# Patient Record
Sex: MALE | Race: AMERICAN INDIAN OR ALASKA NATIVE
[De-identification: names, ages, dates, MRNs, and addresses within clinical notes are randomized per-mention and may not be internally consistent; named-entity substitution may affect disease eponyms.]

---

## 2018-01-25 ENCOUNTER — HOSPITAL ENCOUNTER (EMERGENCY)
Dept: HOSPITAL 31 - C.ER | Age: 38
Discharge: HOME | End: 2018-01-25
Payer: COMMERCIAL

## 2018-01-25 VITALS
RESPIRATION RATE: 20 BRPM | DIASTOLIC BLOOD PRESSURE: 76 MMHG | HEART RATE: 98 BPM | SYSTOLIC BLOOD PRESSURE: 128 MMHG | TEMPERATURE: 101 F

## 2018-01-25 VITALS — OXYGEN SATURATION: 99 %

## 2018-01-25 DIAGNOSIS — B34.9: ICD-10-CM
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
## 2018-01-25 DIAGNOSIS — I10: ICD-10-CM

## 2018-01-25 DIAGNOSIS — S39.011A: Primary | ICD-10-CM

## 2018-01-25 PROCEDURE — 99283 EMERGENCY DEPT VISIT LOW MDM: CPT

## 2018-01-25 PROCEDURE — 96374 THER/PROPH/DIAG INJ IV PUSH: CPT

## 2018-01-25 NOTE — C.PDOC
History Of Present Illness





37 year old male with previous medical history of hypertension, who presents to 

the emergency department with a complaint of left groin pain associated with 

general weakness, malaise, subjective fever and chills ongoing today at work. 

Patient reported he has been working 3 months straight and feels "run down".





PMD: none provided


Time Seen by Provider: 01/25/18 01:29


Chief Complaint (Nursing): Groin Pain


History Per: Patient


History/Exam Limitations: no limitations


Onset/Duration Of Symptoms: Days (x1)


Current Symptoms Are (Timing): Still Present


Severity: Mild


Pain Scale Rating Of: 2


Recent travel outside of the [place]: No


Additional History Per: Patient





Past Medical History


Reviewed: Historical Data, Nursing Documentation, Vital Signs


Vital Signs: 


 Last Vital Signs











Temp  102.3 F H  01/25/18 03:53


 


Pulse  115 H  01/25/18 03:53


 


Resp  22   01/25/18 03:53


 


BP  123/65   01/25/18 03:53


 


Pulse Ox  98   01/25/18 03:53














- Medical History


PMH: HTN


   Denies: Chronic Kidney Disease


Surgical History: Appendectomy





- [facility] Procedures








INSERTION OF INFUSION DEV INTO SUP VENA CAVA, PERC APPROACH (12/17/15)


INSERTION OF INFUSION DEVICE INTO R ATRIUM, PERC APPROACH (12/17/15)








Family History: States: Hypertension





- Social History


Hx Tobacco Use: No


Hx Alcohol Use: No


Hx Substance Use: No





- Immunization History


Hx Tetanus Toxoid Vaccination: No


Hx Influenza Vaccination: No


Hx Pneumococcal Vaccination: No





Review Of Systems


Constitutional: Positive for: Fever, Chills, Weakness, Malaise


ENT: Negative for: Nose Congestion


Cardiovascular: Negative for: Chest Pain


Respiratory: Negative for: Shortness of Breath


Gastrointestinal: Negative for: Nausea, Vomiting, Abdominal Pain


Genitourinary: Positive for: Other (left-sided groin pain)


Musculoskeletal: Negative for: Back Pain


Skin: Negative for: Rash


Neurological: Negative for: Weakness


Psych: Negative for: Anxiety





Physical Exam





- Physical Exam


Appears: Well, No Acute Distress, Other (morbidly obese)


Skin: Warm, Dry


Head: Normacephalic


Eye(s): bilateral: Normal Inspection


Oral Mucosa: Moist


Tongue: Normal Appearing


Throat: Normal


Neck: Supple


Chest: Symmetrical


Cardiovascular: Rhythm Regular


Respiratory: No Decreased Breath Sounds, No Rales, No Rhonchi, No Wheezing


Gastrointestinal/Abdominal: Normal Exam, Soft, No Tenderness


Back: Normal Inspection, No CVA Tenderness, No Vertebral Tenderness


Male Genital: Inguinal Tenderness (left-sided on deep palpation), No Other (

hernia noted)


Extremity: No Tenderness


Extremity: Bilateral: Atraumatic


Neurological/Psych: Oriented x3, Normal Speech, Normal Cognition, Other (

speaking full sentences)


Gait: Steady





ED Course And Treatment


O2 Sat by Pulse Oximetry: 99 (RA)


Pulse Ox Interpretation: Normal


Progress Note: 5:20 feels fine. no complaints


Reevaluation Time: 05:34


Reassessment Condition: Improved





Medical Decision Making


Medical Decision Making: 





Initial Impression: Groin pain





Initial Plan:


* BMP


* CBC


* Lactated Ringers 1,000ml IV per 400mls/hr


* Toradol 30mg IVP





--------------------------------------------------------------------------------

-----------------


Scribe Attestation:


Documented by Yvonne Caballero, acting as a scribe for Michelle Montenegro MD.





Provider Scribe Attestation:


All medical record entries made by the Scribe were at my direction and 

personally dictated by me. I have reviewed the chart and agree that the record 

accurately reflects my personal performance of the history, physical exam, 

medical decision making, and the department course for this patient. I have 

also personally directed, reviewed, and agree with the discharge instructions 

and disposition.





Disposition


Counseled Patient/Family Regarding: Studies Performed, Diagnosis, Need For 

Followup, Rx Given





- Disposition


Referrals: 


[facility] [Outside]


[facility] [Outside]


Disposition: HOME/ ROUTINE


Disposition Time: 01:29


Condition: FAIR


Additional Instructions: 


please return if symptoms recur


Prescriptions: 


Oseltamivir Phosphate [Tamiflu] 75 mg PO BID #10 capsule


Instructions:  Viral Syndrome (ED), Groin Pain (ED)


Forms:  [facility] (English), Work Excuse





- Clinical Impression


Clinical Impression: 


 Viral syndrome, Strain of left inguinal muscle

## 2018-01-29 ENCOUNTER — HOSPITAL ENCOUNTER (INPATIENT)
Dept: HOSPITAL 31 - C.ER | Age: 38
LOS: 8 days | Discharge: HOME | DRG: 603 | End: 2018-02-06
Attending: INTERNAL MEDICINE | Admitting: INTERNAL MEDICINE
Payer: COMMERCIAL

## 2018-01-29 DIAGNOSIS — Z90.49: ICD-10-CM

## 2018-01-29 DIAGNOSIS — I89.0: ICD-10-CM

## 2018-01-29 DIAGNOSIS — F17.210: ICD-10-CM

## 2018-01-29 DIAGNOSIS — G47.33: ICD-10-CM

## 2018-01-29 DIAGNOSIS — L29.9: ICD-10-CM

## 2018-01-29 DIAGNOSIS — I10: ICD-10-CM

## 2018-01-29 DIAGNOSIS — E66.01: ICD-10-CM

## 2018-01-29 DIAGNOSIS — K21.9: ICD-10-CM

## 2018-01-29 DIAGNOSIS — E11.65: ICD-10-CM

## 2018-01-29 DIAGNOSIS — L03.116: Primary | ICD-10-CM

## 2018-01-29 DIAGNOSIS — Z88.0: ICD-10-CM

## 2018-01-29 LAB
ALBUMIN SERPL-MCNC: 3.9 G/DL (ref 3.5–5)
ALBUMIN/GLOB SERPL: 0.9 {RATIO} (ref 1–2.1)
ALT SERPL-CCNC: 31 U/L (ref 21–72)
APTT BLD: 22 SECONDS (ref 21–34)
AST SERPL-CCNC: 32 U/L (ref 17–59)
BASOPHILS # BLD AUTO: 0.2 K/UL (ref 0–0.2)
BASOPHILS NFR BLD: 1.8 % (ref 0–2)
BILIRUB UR-MCNC: NEGATIVE MG/DL
BUN SERPL-MCNC: 15 MG/DL (ref 9–20)
CALCIUM SERPL-MCNC: 9.5 MG/DL (ref 8.6–10.4)
D DIMER: 552 NG/MLDDU (ref 0–243)
EOSINOPHIL # BLD AUTO: 0.2 K/UL (ref 0–0.7)
EOSINOPHIL NFR BLD: 1 % (ref 0–4)
EOSINOPHIL NFR BLD: 1.5 % (ref 0–4)
ERYTHROCYTE [DISTWIDTH] IN BLOOD BY AUTOMATED COUNT: 13.9 % (ref 11.5–14.5)
GFR NON-AFRICAN AMERICAN: > 60
GLUCOSE UR STRIP-MCNC: (no result) MG/DL
HGB BLD-MCNC: 10.6 G/DL (ref 12–18)
HYPOCHROMIC: SLIGHT
INR PPP: 1.2
LEUKOCYTE ESTERASE UR-ACNC: (no result) LEU/UL
LYMPHOCYTE: 10 % (ref 20–40)
LYMPHOCYTES # BLD AUTO: 0.9 K/UL (ref 1–4.3)
LYMPHOCYTES NFR BLD AUTO: 7.8 % (ref 20–40)
MCH RBC QN AUTO: 28.7 PG (ref 27–31)
MCHC RBC AUTO-ENTMCNC: 33.1 G/DL (ref 33–37)
MCV RBC AUTO: 86.6 FL (ref 80–94)
MONOCYTE: 17 % (ref 0–10)
MONOCYTES # BLD: 1.2 K/UL (ref 0–0.8)
MONOCYTES NFR BLD: 9.8 % (ref 0–10)
NEUTROPHILS # BLD: 9.7 K/UL (ref 1.8–7)
NEUTROPHILS NFR BLD AUTO: 72 % (ref 50–75)
NEUTROPHILS NFR BLD AUTO: 79.1 % (ref 50–75)
NRBC BLD AUTO-RTO: 0 % (ref 0–2)
PH UR STRIP: 6 [PH] (ref 5–8)
PLATELET # BLD EST: NORMAL 10*3/UL
PLATELET # BLD: 228 K/UL (ref 130–400)
PMV BLD AUTO: 10.7 FL (ref 7.2–11.7)
PROT UR STRIP-MCNC: (no result) MG/DL
PROTHROMBIN TIME: 13.4 SECONDS (ref 9.7–12.2)
RBC # BLD AUTO: 3.69 MIL/UL (ref 4.4–5.9)
RBC # UR STRIP: (no result) /UL
SP GR UR STRIP: 1.02 (ref 1–1.03)
SQUAMOUS EPITHIAL: < 1 /HPF (ref 0–5)
TOTAL CELLS COUNTED BLD: 100
URINE NITRATE: NEGATIVE
UROBILINOGEN UR-MCNC: NORMAL MG/DL (ref 0.2–1)
WBC # BLD AUTO: 12.3 K/UL (ref 4.8–10.8)

## 2018-01-29 NOTE — C.PDOC
History Of Present Illness


37 year old male sent to ER by Dr. Walker for swelling and pain to the left 

lower extremity for the past week. Denies fever or chills.


Time Seen by Provider: 01/29/18 21:02


Chief Complaint (Nursing): Lower Extremity Problem/Injury


History Per: Patient


History/Exam Limitations: no limitations


Onset/Duration Of Symptoms: Days


Current Symptoms Are (Timing): Still Present


Recent travel outside of the [place]: No





Past Medical History


Reviewed: Historical Data, Nursing Documentation, Vital Signs


Vital Signs: 


 Last Vital Signs











Temp  99.9 F H  01/29/18 20:47


 


Pulse  101 H  01/29/18 20:47


 


Resp  18   01/29/18 20:47


 


BP  132/78   01/29/18 20:47


 


Pulse Ox  95   01/29/18 22:37














- Medical History


PMH: HTN


Surgical History: Appendectomy





- [facility] Procedures








INSERTION OF INFUSION DEV INTO SUP VENA CAVA, PERC APPROACH (12/17/15)


INSERTION OF INFUSION DEVICE INTO R ATRIUM, PERC APPROACH (12/17/15)








Family History: States: Hypertension





- Social History


Hx Tobacco Use: No


Hx Alcohol Use: No


Hx Substance Use: No





- Immunization History


Hx Tetanus Toxoid Vaccination: No


Hx Influenza Vaccination: No


Hx Pneumococcal Vaccination: No





Review Of Systems


Constitutional: Negative for: Fever, Chills


Cardiovascular: Negative for: Chest Pain, Palpitations


Gastrointestinal: Positive for: Abdominal Pain.  Negative for: Nausea, Vomiting


Genitourinary: Negative for: Dysuria, Hematuria


Musculoskeletal: Positive for: Leg Pain (w/ swelling)





Physical Exam





- Physical Exam


Appears: Non-toxic, No Acute Distress


Skin: Warm, Dry


Head: Atraumatic, Normacephalic


Eye(s): bilateral: Normal Inspection


Oral Mucosa: Moist


Chest: Symmetrical, No Tenderness


Cardiovascular: Rhythm Regular


Respiratory: Normal Breath Sounds, No Rales, No Rhonchi, No Wheezing


Gastrointestinal/Abdominal: Soft, No Tenderness


Extremity: Other (Redness and tenderness to the left lower leg area. Warm to 

touch. No open wound noted.)


Pulses: Left Dorsalis Pedis: Normal, Right Dorsalis Pedis: Normal


Neurological/Psych: Oriented x3, Normal Speech, Normal Motor, Normal Sensation





ED Course And Treatment





- Laboratory Results


Result Diagrams: 


 01/29/18 22:05





 01/29/18 22:05


O2 Sat by Pulse Oximetry: 95 (Room air)


Pulse Ox Interpretation: Normal


Progress Note: Blood work and urinalysis ordered. IV fluids, insulin, and 

rocephin administered.





Disposition


Discussed With Dr.: Giovany Walker


Doctor Will See Patient In The: Hospital


Counseled Patient/Family Regarding: Diagnosis





- Disposition


Disposition: HOSPITALIZED


Disposition Time: 22:35


Condition: STABLE


Forms:  [facility] (English)





- POA


Present On Arrival: None





- Clinical Impression


Clinical Impression: 


 Cellulitis, Uncontrolled diabetes mellitus, Hyperglycemia without ketosis








- Scribe Statement


The provider has reviewed the documentation as recorded by the Scribsugar Trammell





All medical record entries made by the Sandyibsugar were at my direction and 

personally dictated by me. I have reviewed the chart and agree that the record 

accurately reflects my personal performance of the history, physical exam, 

medical decision making, and the department course for this patient. I have 

also personally directed, reviewed, and agree with the discharge instructions 

and disposition.

## 2018-01-30 LAB
ALBUMIN SERPL-MCNC: 3.6 G/DL (ref 3.5–5)
ALBUMIN/GLOB SERPL: 1 {RATIO} (ref 1–2.1)
ALT SERPL-CCNC: 34 U/L (ref 21–72)
ANISOCYTOSIS BLD QL SMEAR: SLIGHT
AST SERPL-CCNC: 52 U/L (ref 17–59)
BASOPHILS # BLD AUTO: 0 K/UL (ref 0–0.2)
BASOPHILS NFR BLD: 0.5 % (ref 0–2)
BUN SERPL-MCNC: 11 MG/DL (ref 9–20)
CALCIUM SERPL-MCNC: 9 MG/DL (ref 8.6–10.4)
EOSINOPHIL # BLD AUTO: 0.2 K/UL (ref 0–0.7)
EOSINOPHIL NFR BLD: 2 % (ref 0–4)
EOSINOPHIL NFR BLD: 2.1 % (ref 0–4)
ERYTHROCYTE [DISTWIDTH] IN BLOOD BY AUTOMATED COUNT: 13.9 % (ref 11.5–14.5)
GFR NON-AFRICAN AMERICAN: > 60
HGB BLD-MCNC: 10.3 G/DL (ref 12–18)
HYPOCHROMIC: SLIGHT
LG PLATELETS BLD QL SMEAR: PRESENT
LYMPHOCYTE: 6 % (ref 20–40)
LYMPHOCYTES # BLD AUTO: 0.6 K/UL (ref 1–4.3)
LYMPHOCYTES NFR BLD AUTO: 6 % (ref 20–40)
MCH RBC QN AUTO: 29.6 PG (ref 27–31)
MCHC RBC AUTO-ENTMCNC: 34.7 G/DL (ref 33–37)
MCV RBC AUTO: 85.2 FL (ref 80–94)
MONOCYTE: 7 % (ref 0–10)
MONOCYTES # BLD: 0.7 K/UL (ref 0–0.8)
MONOCYTES NFR BLD: 7.2 % (ref 0–10)
NEUTROPHILS # BLD: 7.7 K/UL (ref 1.8–7)
NEUTROPHILS NFR BLD AUTO: 84.2 % (ref 50–75)
NEUTROPHILS NFR BLD AUTO: 85 % (ref 50–75)
NRBC BLD AUTO-RTO: 0.1 % (ref 0–2)
PLATELET # BLD EST: NORMAL 10*3/UL
PLATELET # BLD: 245 K/UL (ref 130–400)
PMV BLD AUTO: 9.6 FL (ref 7.2–11.7)
POIKILOCYTOSIS BLD QL SMEAR: SLIGHT
RBC # BLD AUTO: 3.48 MIL/UL (ref 4.4–5.9)
TARGETS BLD QL SMEAR: SLIGHT
TOTAL CELLS COUNTED BLD: 100
WBC # BLD AUTO: 9.1 K/UL (ref 4.8–10.8)

## 2018-01-30 RX ADMIN — SODIUM CHLORIDE SCH MLS/HR: 9 INJECTION, SOLUTION INTRAVENOUS at 11:41

## 2018-01-30 RX ADMIN — IPRATROPIUM BROMIDE AND ALBUTEROL SULFATE SCH ML: .5; 3 SOLUTION RESPIRATORY (INHALATION) at 15:11

## 2018-01-30 RX ADMIN — HUMAN INSULIN SCH UNIT: 100 INJECTION, SOLUTION SUBCUTANEOUS at 08:09

## 2018-01-30 RX ADMIN — Medication SCH CAP: at 17:45

## 2018-01-30 RX ADMIN — HUMAN INSULIN SCH UNIT: 100 INJECTION, SOLUTION SUBCUTANEOUS at 16:30

## 2018-01-30 RX ADMIN — HUMAN INSULIN SCH UNIT: 100 INJECTION, SOLUTION SUBCUTANEOUS at 12:02

## 2018-01-30 RX ADMIN — HUMAN INSULIN SCH UNIT: 100 INJECTION, SOLUTION SUBCUTANEOUS at 22:07

## 2018-01-30 RX ADMIN — HUMAN INSULIN SCH UNIT: 100 INJECTION, SOLUTION SUBCUTANEOUS at 00:06

## 2018-01-30 RX ADMIN — IPRATROPIUM BROMIDE AND ALBUTEROL SULFATE SCH: .5; 3 SOLUTION RESPIRATORY (INHALATION) at 20:02

## 2018-01-30 RX ADMIN — ENOXAPARIN SODIUM SCH MG: 30 INJECTION SUBCUTANEOUS at 21:25

## 2018-01-30 RX ADMIN — SODIUM CHLORIDE SCH MLS/HR: 9 INJECTION, SOLUTION INTRAVENOUS at 18:11

## 2018-01-30 RX ADMIN — GUAIFENESIN AND DEXTROMETHORPHAN SCH ML: 100; 10 SYRUP ORAL at 17:45

## 2018-01-30 RX ADMIN — ENOXAPARIN SODIUM SCH MG: 30 INJECTION SUBCUTANEOUS at 10:04

## 2018-01-30 RX ADMIN — Medication SCH CAP: at 10:04

## 2018-01-30 NOTE — RAD
HISTORY:

sob, cough  



COMPARISON:

12/22/2015



TECHNIQUE:

Chest PA and lateral



FINDINGS:



LUNGS:

No active pulmonary disease.



PLEURA:

No significant pleural effusion identified. No pneumothorax apparent.



CARDIOVASCULAR:

Normal.



OSSEOUS STRUCTURES:

No significant abnormalities.



VISUALIZED UPPER ABDOMEN:

Normal.



OTHER FINDINGS:

None.



IMPRESSION:

No active disease.

## 2018-01-30 NOTE — CP.PCM.HP
History of Present Illness





- History of Present Illness


History of Present Illness: 





COMPREHENSIVE   HISTORY & PHYSICAL EXAM 


   


HPI


ADMITTED WITH CELLULITIS OF LEFT CALF WITH OUT PT FAILURE OF PO AB 


FEW DAYS AGO PT PRESENTED TO PMD WITH FLU LIKE SYMPTOMS AND LEFT CALF PAIN AND 

SWELLING . PT WAS GIVEN TAMIFLU . PRESENTED NEXT DAY TO  ER AND WAS GIVEN PO 

KEFLEX . 


ABOVE PO AB DID NOT IMPROVE PT'S SYMPTOMS AND SWELLING GOT WORSE A/W 

DIAPHORESIS , FEVER AND FATIGUED 


PT ALSO WAS FOUND TO HAVE NEW ONSET OF HIGH SUGAR 





PAST HIST.


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 


PERSONAL HIST:   Smoking.   N   Alcohol.   N      Allergy N            Travel_-

      .


FAMILY HIST : 


ROS :


Constitutional: POS CHILLS /FATIGUE 


  Eyes: Negative for redness, swelling, itching, discharge, vision changes, 

blurry vision, double vision, glaucoma, cataracts, 


  Ears: Negative for hearing loss, ringing, , tinnitus, vertigo


 Nose: Negative for rhinorrhea, stuffiness, sniffing, itching, postnasal drip, 

discoloration, nasal congestion and epistaxis. 


 Throat: Negative for throat clearing, sore throat, hoarseness, difficulty 

swallowing and difficulty speaking. 


  Respiratory: Negative for  pleuritic chest pain ,daytime somnolence, chronic 

cough, hemoptysis, snoring at night,


 Cardiovascular: Negative for chest pain, palpitations, orthopnea, PND, Edema 

of legs, leg cramps, angina, claudication, , irregular heartbeat,


 Neurology: Negative for irritability, muscle weakness, numbness and tingling, 

seizures, tremors, migraines, slurred speech, syncope, memory loss, mood changes

, recurrent headaches 


Gastrointestinal: Negative for difficulty swallowing, diarrhea, constipation, 

black stools, rectal bleeding, nausea, flatulence, reflux, poor appetite, 

changes in bowel habits, abdominal pain


  Genitourinary: Negative for frequent urination, hematuria, discharge, 

incontinence, urinary retention, frequent UTI, Psychiatric: Negative for 

depression, anxiety/panic, suicidal tendencies, 


Musculoskeletal:  LEFT CALF PAIN 


 Skin: Negative for rash, ulcers, itching, dry skin and pigmented lesions.


P/E: 


  Constitutional: Appears stated age and in no apparent distress. 


 Head: Normocephalic. 


  Ears: External ear canals patent without inflammation. Tympanic membranes 

intact with normal light reflex and landmark. 


  Eyes: Pupils are central, bilaterally equal, symmetrical and reacts to light 

with normal movements and no icterus or pallor. 


 Nose: External nares are patent. Mucosa is pink  Mouth-Throat: Good general 

appearance and condition. No post-pharyngeal/oropharyngeal erythema and 

tonsillar hypertrophy. Good dental hygiene. 


  Neck-Lymphatic: Neck is supple with normal ROM, no thyromegaly, lymph nodes 

or masses. JVD is normal with no carotid bruit. 


  Lungs: Clear to percussion and auscultation with bilateral normal air entry. 


  Cardiovascular: S1 and S2 are normal with no murmurs, gallops and rub. 


  GI Exam: No hepatomegaly. Abdomen is soft and non-tender. No Organomegaly , 

masses or hernias are evident and bowel sounds are normal and active. 


  Neurology: Higher function and all cranial nerves intact, with no gross motor 

or sensory deficit. Superficial and deep reflexes are normal with downwards 

planters. No cerebellar deficit with normal gait. 


  Musculoskeletal: COMPARED TO R, LEFT CALF IS SWOLLEN , INCREASE TEMP,  . BOTH 

LEGS CH LYMPHEDEMA 


  Extremities: Homans sign absent. Intact pulses with no pitting edema, calf 

tenderness or skin color changes. 


  Skin: No rash, eruptions or abnormal skin pigmentation





LAB/RADIOLOGY:


ASSESMENT :


LEFT LEG CELLULITIS WITH CH. LYMPHEDEMA 


HTN


NEW ONSET DM 





PLAN: 


SEE ORDERS 








Present on Admission





- Present on Admission


Any Indicators Present on Admission: No





Past Patient History





- Infectious Disease


Hx of Infectious Diseases: None





- Past Medical History & Family History


Past Medical History?: Yes





- Past Social History


Smoking Status: Light Smoker < 10 Cigarettes Daily





- CARDIAC


Hx Hypertension: Yes





- PULMONARY


Hx Respiratory Disorders: No





- NEUROLOGICAL


Hx Neurological Disorder: No





- HEENT


Hx HEENT Problems: No





- RENAL


Hx Chronic Kidney Disease: No





- ENDOCRINE/METABOLIC


Hx Endocrine Disorders: No





- HEMATOLOGICAL/ONCOLOGICAL


Hx Blood Disorders: No





- INTEGUMENTARY


Hx Dermatological Problems: No





- MUSCULOSKELETAL/RHEUMATOLOGICAL


Hx Musculoskeletal Disorders: Yes


Hx Falls: No


Other/Comment: Left lower extremity cellulitis, LYMPHEDEMA





- GASTROINTESTINAL


Hx Gastrointestinal Disorders: No





- GENITOURINARY/GYNECOLOGICAL


Hx Genitourinary Disorders: No





- PSYCHIATRIC


Hx Psychophysiologic Disorder: No


Hx Substance Use: No





- SURGICAL HISTORY


Hx Surgeries: Yes


Hx Appendectomy: Yes





- ANESTHESIA


Hx Anesthesia: Yes


Hx Anesthesia Reactions: No





Meds


Allergies/Adverse Reactions: 


 Allergies











Allergy/AdvReac Type Severity Reaction Status Date / Time


 


No Known Allergies Allergy   Verified 01/29/18 20:56














Results





- Vital Signs


Recent Vital Signs: 





 Last Vital Signs











Temp  98.9 F   01/30/18 07:59


 


Pulse  97 H  01/30/18 07:59


 


Resp  20   01/30/18 07:59


 


BP  143/65   01/30/18 07:59


 


Pulse Ox  96   01/30/18 07:59














- Labs


Result Diagrams: 


 01/30/18 12:03





 01/30/18 12:03


Labs: 





 Laboratory Results - last 24 hr











  01/29/18 01/29/18 01/29/18





  22:05 22:05 22:05


 


WBC  12.3 H  


 


RBC  3.69 L  


 


Hgb  10.6 L  


 


Hct  31.9 L  


 


MCV  86.6  


 


MCH  28.7  


 


MCHC  33.1  


 


RDW  13.9  


 


Plt Count  228  D  


 


MPV  10.7  


 


Neut % (Auto)  79.1 H  


 


Lymph % (Auto)  7.8 L  


 


Mono % (Auto)  9.8  


 


Eos % (Auto)  1.5  


 


Baso % (Auto)  1.8  


 


Neut #  9.7 H  


 


Lymph #  0.9 L  


 


Mono #  1.2 H  


 


Eos #  0.2  


 


Baso #  0.2  


 


Neutrophils % (Manual)  72  


 


Lymphocytes % (Manual)  10 L  


 


Monocytes % (Manual)  17 H  


 


Eosinophils % (Manual)  1  


 


Platelet Estimate  Normal  


 


Large Platelets   


 


Hypochromasia (manual)  Slight  


 


Poikilocytosis (manual   


 


Anisocytosis (manual)   


 


Target Cells   


 


PT   


 


INR   


 


APTT   


 


D-Dimer, Quantitative   


 


Sodium    124 L


 


Potassium    4.0


 


Chloride    88 L


 


Carbon Dioxide    26


 


Anion Gap    14


 


BUN    15


 


Creatinine    1.1


 


Est GFR ( Amer)    > 60


 


Est GFR (Non-Af Amer)    > 60


 


POC Glucose (mg/dL)   


 


Random Glucose    446 H* D


 


Calcium    9.5


 


Total Bilirubin    0.9


 


AST    32


 


ALT    31


 


Alkaline Phosphatase    92


 


Total Protein    8.4 H


 


Albumin    3.9


 


Globulin    4.5 H


 


Albumin/Globulin Ratio    0.9 L


 


Urine Color   Yellow 


 


Urine Clarity   Clear 


 


Urine pH   6.0 


 


Ur Specific Gravity   1.024 


 


Urine Protein   2+ H 


 


Urine Glucose (UA)   3+ H 


 


Urine Ketones   Negative 


 


Urine Blood   1+ H 


 


Urine Nitrate   Negative 


 


Urine Bilirubin   Negative 


 


Urine Urobilinogen   Normal 


 


Ur Leukocyte Esterase   Neg 


 


Urine WBC (Auto)   3 


 


Urine RBC (Auto)   2 


 


Ur Squamous Epith Cells   < 1 


 


Serum Ketones   














  01/29/18 01/29/18 01/30/18





  22:39 22:45 00:02


 


WBC   


 


RBC   


 


Hgb   


 


Hct   


 


MCV   


 


MCH   


 


MCHC   


 


RDW   


 


Plt Count   


 


MPV   


 


Neut % (Auto)   


 


Lymph % (Auto)   


 


Mono % (Auto)   


 


Eos % (Auto)   


 


Baso % (Auto)   


 


Neut #   


 


Lymph #   


 


Mono #   


 


Eos #   


 


Baso #   


 


Neutrophils % (Manual)   


 


Lymphocytes % (Manual)   


 


Monocytes % (Manual)   


 


Eosinophils % (Manual)   


 


Platelet Estimate   


 


Large Platelets   


 


Hypochromasia (manual)   


 


Poikilocytosis (manual   


 


Anisocytosis (manual)   


 


Target Cells   


 


PT  13.4 H  


 


INR  1.2  


 


APTT  22  


 


D-Dimer, Quantitative  552 H  


 


Sodium   


 


Potassium   


 


Chloride   


 


Carbon Dioxide   


 


Anion Gap   


 


BUN   


 


Creatinine   


 


Est GFR ( Amer)   


 


Est GFR (Non-Af Amer)   


 


POC Glucose (mg/dL)    321 H


 


Random Glucose   


 


Calcium   


 


Total Bilirubin   


 


AST   


 


ALT   


 


Alkaline Phosphatase   


 


Total Protein   


 


Albumin   


 


Globulin   


 


Albumin/Globulin Ratio   


 


Urine Color   


 


Urine Clarity   


 


Urine pH   


 


Ur Specific Gravity   


 


Urine Protein   


 


Urine Glucose (UA)   


 


Urine Ketones   


 


Urine Blood   


 


Urine Nitrate   


 


Urine Bilirubin   


 


Urine Urobilinogen   


 


Ur Leukocyte Esterase   


 


Urine WBC (Auto)   


 


Urine RBC (Auto)   


 


Ur Squamous Epith Cells   


 


Serum Ketones   Negative 














  01/30/18 01/30/18 01/30/18





  07:21 11:35 12:03


 


WBC    9.1


 


RBC    3.48 L


 


Hgb    10.3 L


 


Hct    29.6 L


 


MCV    85.2


 


MCH    29.6


 


MCHC    34.7


 


RDW    13.9


 


Plt Count    245


 


MPV    9.6


 


Neut % (Auto)    84.2 H


 


Lymph % (Auto)    6.0 L


 


Mono % (Auto)    7.2


 


Eos % (Auto)    2.1


 


Baso % (Auto)    0.5


 


Neut #    7.7 H


 


Lymph #    0.6 L


 


Mono #    0.7


 


Eos #    0.2


 


Baso #    0.0


 


Neutrophils % (Manual)    85 H


 


Lymphocytes % (Manual)    6 L


 


Monocytes % (Manual)    7


 


Eosinophils % (Manual)    2


 


Platelet Estimate    Normal


 


Large Platelets    Present


 


Hypochromasia (manual)    Slight


 


Poikilocytosis (manual    Slight


 


Anisocytosis (manual)    Slight


 


Target Cells    Slight


 


PT   


 


INR   


 


APTT   


 


D-Dimer, Quantitative   


 


Sodium   


 


Potassium   


 


Chloride   


 


Carbon Dioxide   


 


Anion Gap   


 


BUN   


 


Creatinine   


 


Est GFR ( Amer)   


 


Est GFR (Non-Af Amer)   


 


POC Glucose (mg/dL)  299 H  352 H 


 


Random Glucose   


 


Calcium   


 


Total Bilirubin   


 


AST   


 


ALT   


 


Alkaline Phosphatase   


 


Total Protein   


 


Albumin   


 


Globulin   


 


Albumin/Globulin Ratio   


 


Urine Color   


 


Urine Clarity   


 


Urine pH   


 


Ur Specific Gravity   


 


Urine Protein   


 


Urine Glucose (UA)   


 


Urine Ketones   


 


Urine Blood   


 


Urine Nitrate   


 


Urine Bilirubin   


 


Urine Urobilinogen   


 


Ur Leukocyte Esterase   


 


Urine WBC (Auto)   


 


Urine RBC (Auto)   


 


Ur Squamous Epith Cells   


 


Serum Ketones   














  01/30/18





  12:03


 


WBC 


 


RBC 


 


Hgb 


 


Hct 


 


MCV 


 


MCH 


 


MCHC 


 


RDW 


 


Plt Count 


 


MPV 


 


Neut % (Auto) 


 


Lymph % (Auto) 


 


Mono % (Auto) 


 


Eos % (Auto) 


 


Baso % (Auto) 


 


Neut # 


 


Lymph # 


 


Mono # 


 


Eos # 


 


Baso # 


 


Neutrophils % (Manual) 


 


Lymphocytes % (Manual) 


 


Monocytes % (Manual) 


 


Eosinophils % (Manual) 


 


Platelet Estimate 


 


Large Platelets 


 


Hypochromasia (manual) 


 


Poikilocytosis (manual 


 


Anisocytosis (manual) 


 


Target Cells 


 


PT 


 


INR 


 


APTT 


 


D-Dimer, Quantitative 


 


Sodium  128 L


 


Potassium  3.5 L


 


Chloride  93 L


 


Carbon Dioxide  27


 


Anion Gap  12


 


BUN  11


 


Creatinine  1.0


 


Est GFR ( Amer)  > 60


 


Est GFR (Non-Af Amer)  > 60


 


POC Glucose (mg/dL) 


 


Random Glucose  394 H


 


Calcium  9.0


 


Total Bilirubin  0.7


 


AST  52


 


ALT  34


 


Alkaline Phosphatase  126  D


 


Total Protein  7.4


 


Albumin  3.6


 


Globulin  3.8


 


Albumin/Globulin Ratio  1.0


 


Urine Color 


 


Urine Clarity 


 


Urine pH 


 


Ur Specific Gravity 


 


Urine Protein 


 


Urine Glucose (UA) 


 


Urine Ketones 


 


Urine Blood 


 


Urine Nitrate 


 


Urine Bilirubin 


 


Urine Urobilinogen 


 


Ur Leukocyte Esterase 


 


Urine WBC (Auto) 


 


Urine RBC (Auto) 


 


Ur Squamous Epith Cells 


 


Serum Ketones

## 2018-01-30 NOTE — VASCLAB
PROCEDURE:  Lower Extremity Venous Duplex Exam.



HISTORY:

cellulitis of lower leg 



PRIORS:

None. 



TECHNIQUE:

Bilateral common femoral, femoral, popliteal and posterior tibial, 

peroneal and great saphenous veins were evaluated. Flow was assessed 

with color Doppler, compressibility, assessment of phasic flow and 

augmentation response.



Report prepared by   JOSE Ellis, RVT



FINDINGS:



RIGHT:

1. Common Femoral Vein: 



1.1. Compressibility - Fully compressible: Thrombus -  None : Flow - 

Phasic: Augmentation -Normal: Reflux - None.



2. Femoral Vein:



2.1. Compressibility - Fully compressible: Thrombus -  None : Flow - 

Phasic: Augmentation -Normal: Reflux - None.



3. Popliteal Vein: 



3.1. Compressibility - Fully compressible: Thrombus - None :  Flow - 

Phasic: Augmentation -Normal: Reflux - None.



4. Posterior Tibial Vein: 



4.1. Compressibility - : Thrombus -  : Flow - : Augmentation -: 

Reflux - .



5. Peroneal Vein:



5.1. Compressibility - : Thrombus -  : Flow - : Augmentation -: 

Reflux - .



6. Great Saphenous Vein:

6.1. Compressibility - Fully compressible: Thrombus - None: Flow - 

Phasic: Augmentation - Normal: Reflux - None.





LEFT:

1. Common Femoral Vein:



1.1.  Compressibility - Fully compressible: Thrombus -  None: Flow - 

Phasic: Augmentation -Normal: Reflux - None.



2. Femoral Vein:



2.1.  Compressibility - Fully compressible: Thrombus -  None: Flow - 

Phasic: Augmentation -Normal: Reflux - None.



3. Popliteal Vein:



3.1.  Compressibility - Fully compressible: Thrombus -  None : Flow - 

Phasic: Augmentation -Normal: Reflux - None.



4. Posterior Tibial Vein:



4.1.  Compressibility - : Thrombus -  : Flow - : Augmentation -: 

Reflux - .



5. Peroneal Vein:



5.1.  Compressibility - : Thrombus -  : Flow - : Augmentation -: 

Reflux - .



6. Great Saphenous Vein:

6.1.  Compressibility - Fully compressible: Thrombus -  None: Flow - 

Phasic: Augmentation - Normal: Reflux - None.





OTHER FINDINGS:  Due to swelling in the calves, bilateral peroneal 

and posterior tibial vein are not visualized.



IMPRESSION:

Right: 



No evidence of deep or superficial vein thrombosis of the right lower 

extremity. Normal valve function noted of the right side.     



Left: 



No evidence of deep or superficial vein thrombosis of the left lower 

extremity. Normal valve function noted of the left side.

## 2018-01-30 NOTE — CP.PCM.CON
History of Present Illness





- History of Present Illness


History of Present Illness: 





PATIENT SEEN.


CHART REVIEWED.





CONSULT DICTATED;


SEE REPORT;SEE ORDER SHEET.





THANK YOU.





Past Patient History





- Infectious Disease


Hx of Infectious Diseases: None





- Past Medical History & Family History


Past Medical History?: Yes





- Past Social History


Smoking Status: Light Smoker < 10 Cigarettes Daily





- CARDIAC


Hx Hypertension: Yes





- PULMONARY


Hx Respiratory Disorders: No





- NEUROLOGICAL


Hx Neurological Disorder: No





- HEENT


Hx HEENT Problems: No





- RENAL


Hx Chronic Kidney Disease: No





- ENDOCRINE/METABOLIC


Hx Endocrine Disorders: No





- HEMATOLOGICAL/ONCOLOGICAL


Hx Blood Disorders: No





- INTEGUMENTARY


Hx Dermatological Problems: No





- MUSCULOSKELETAL/RHEUMATOLOGICAL


Hx Musculoskeletal Disorders: Yes


Hx Falls: No


Other/Comment: Left lower extremity cellulitis, LYMPHEDEMA





- GASTROINTESTINAL


Hx Gastrointestinal Disorders: No





- GENITOURINARY/GYNECOLOGICAL


Hx Genitourinary Disorders: No





- PSYCHIATRIC


Hx Psychophysiologic Disorder: No


Hx Substance Use: No





- SURGICAL HISTORY


Hx Surgeries: Yes


Hx Appendectomy: Yes





- ANESTHESIA


Hx Anesthesia: Yes


Hx Anesthesia Reactions: No





Meds


Allergies/Adverse Reactions: 


 Allergies











Allergy/AdvReac Type Severity Reaction Status Date / Time


 


No Known Allergies Allergy   Verified 01/29/18 20:56














- Medications


Medications: 


 Current Medications





Acetaminophen (Tylenol 325mg Tab)  650 mg PO Q6 PRN


   PRN Reason: FEVER/  PAIN


   Last Admin: 01/30/18 01:17 Dose:  650 mg


Albuterol/Ipratropium (Duoneb 3 Mg/0.5 Mg (3 Ml) Ud)  3 ml INH RQ6 [facility]


   Last Admin: 01/30/18 20:02 Dose:  Not Given


Bumetanide (Bumex)  1 mg PO DAILY [facility]


   Last Admin: 01/30/18 10:04 Dose:  1 mg


Enoxaparin Sodium (Lovenox)  30 mg SC Q12 [facility]


   Last Admin: 01/30/18 21:25 Dose:  30 mg


Guaifenesin/Dextromethorphan (Robitussin Dm)  5 ml PO Q6 [facility]


   Last Admin: 01/30/18 17:45 Dose:  5 ml


Piperacillin Sod/Tazobactam (Sod 3.375 gm/ Sodium Chloride)  100 mls @ 200 mls/

hr IVPB Q8H [facility]


   Last Admin: 01/30/18 18:11 Dose:  200 mls/hr


Vancomycin HCl 1,250 mg/ (Dextrose)  250 mls @ 120 mls/hr IVPB Q12H [facility]


Insulin Human Regular (Novolin R)  0 unit SC ACHS [facility]


   PRN Reason: Protocol


   Last Admin: 01/30/18 22:07 Dose:  2 unit


Lactobacillus Acidophilus (Bacid Acidophilus)  1 cap PO BID [facility]


   Last Admin: 01/30/18 17:45 Dose:  1 cap


Lisinopril (Zestril)  40 mg PO DAILY [facility]


   Last Admin: 01/30/18 10:05 Dose:  40 mg


Pneumococcal Polyvalent Vaccine (Pneumovax 23 Vaccine)  0.5 ml IM .ONCE ONE


   Stop: 02/01/18 10:01











Results





- Vital Signs


Recent Vital Signs: 


 Last Vital Signs











Temp  99.2 F   01/30/18 16:00


 


Pulse  104 H  01/30/18 16:00


 


Resp  20   01/30/18 16:00


 


BP  146/72   01/30/18 16:00


 


Pulse Ox  95   01/30/18 16:00














- Labs


Result Diagrams: 


 01/31/18 08:33





 01/31/18 08:33


Labs: 


 Laboratory Results - last 24 hr











  01/29/18 01/29/18 01/29/18





  22:05 22:05 22:05


 


WBC   


 


RBC   


 


Hgb   


 


Hct   


 


MCV   


 


MCH   


 


MCHC   


 


RDW   


 


Plt Count   


 


MPV   


 


Neut % (Auto)   


 


Lymph % (Auto)   


 


Mono % (Auto)   


 


Eos % (Auto)   


 


Baso % (Auto)   


 


Neut #   


 


Lymph #   


 


Mono #   


 


Eos #   


 


Baso #   


 


Neutrophils % (Manual)  72  


 


Lymphocytes % (Manual)  10 L  


 


Monocytes % (Manual)  17 H  


 


Eosinophils % (Manual)  1  


 


Platelet Estimate  Normal  


 


Large Platelets   


 


Hypochromasia (manual)  Slight  


 


Poikilocytosis (manual   


 


Anisocytosis (manual)   


 


Target Cells   


 


PT   


 


INR   


 


APTT   


 


D-Dimer, Quantitative   


 


Sodium    124 L


 


Potassium    4.0


 


Chloride    88 L


 


Carbon Dioxide    26


 


Anion Gap    14


 


BUN    15


 


Creatinine    1.1


 


Est GFR ( Amer)    > 60


 


Est GFR (Non-Af Amer)    > 60


 


POC Glucose (mg/dL)   


 


Random Glucose    446 H* D


 


Calcium    9.5


 


Total Bilirubin    0.9


 


AST    32


 


ALT    31


 


Alkaline Phosphatase    92


 


Total Protein    8.4 H


 


Albumin    3.9


 


Globulin    4.5 H


 


Albumin/Globulin Ratio    0.9 L


 


Urine Color   Yellow 


 


Urine Clarity   Clear 


 


Urine pH   6.0 


 


Ur Specific Gravity   1.024 


 


Urine Protein   2+ H 


 


Urine Glucose (UA)   3+ H 


 


Urine Ketones   Negative 


 


Urine Blood   1+ H 


 


Urine Nitrate   Negative 


 


Urine Bilirubin   Negative 


 


Urine Urobilinogen   Normal 


 


Ur Leukocyte Esterase   Neg 


 


Urine WBC (Auto)   3 


 


Urine RBC (Auto)   2 


 


Ur Squamous Epith Cells   < 1 


 


Serum Ketones   














  01/29/18 01/29/18 01/30/18





  22:39 22:45 00:02


 


WBC   


 


RBC   


 


Hgb   


 


Hct   


 


MCV   


 


MCH   


 


MCHC   


 


RDW   


 


Plt Count   


 


MPV   


 


Neut % (Auto)   


 


Lymph % (Auto)   


 


Mono % (Auto)   


 


Eos % (Auto)   


 


Baso % (Auto)   


 


Neut #   


 


Lymph #   


 


Mono #   


 


Eos #   


 


Baso #   


 


Neutrophils % (Manual)   


 


Lymphocytes % (Manual)   


 


Monocytes % (Manual)   


 


Eosinophils % (Manual)   


 


Platelet Estimate   


 


Large Platelets   


 


Hypochromasia (manual)   


 


Poikilocytosis (manual   


 


Anisocytosis (manual)   


 


Target Cells   


 


PT  13.4 H  


 


INR  1.2  


 


APTT  22  


 


D-Dimer, Quantitative  552 H  


 


Sodium   


 


Potassium   


 


Chloride   


 


Carbon Dioxide   


 


Anion Gap   


 


BUN   


 


Creatinine   


 


Est GFR ( Amer)   


 


Est GFR (Non-Af Amer)   


 


POC Glucose (mg/dL)    321 H


 


Random Glucose   


 


Calcium   


 


Total Bilirubin   


 


AST   


 


ALT   


 


Alkaline Phosphatase   


 


Total Protein   


 


Albumin   


 


Globulin   


 


Albumin/Globulin Ratio   


 


Urine Color   


 


Urine Clarity   


 


Urine pH   


 


Ur Specific Gravity   


 


Urine Protein   


 


Urine Glucose (UA)   


 


Urine Ketones   


 


Urine Blood   


 


Urine Nitrate   


 


Urine Bilirubin   


 


Urine Urobilinogen   


 


Ur Leukocyte Esterase   


 


Urine WBC (Auto)   


 


Urine RBC (Auto)   


 


Ur Squamous Epith Cells   


 


Serum Ketones   Negative 














  01/30/18 01/30/18 01/30/18





  07:21 11:35 12:03


 


WBC    9.1


 


RBC    3.48 L


 


Hgb    10.3 L


 


Hct    29.6 L


 


MCV    85.2


 


MCH    29.6


 


MCHC    34.7


 


RDW    13.9


 


Plt Count    245


 


MPV    9.6


 


Neut % (Auto)    84.2 H


 


Lymph % (Auto)    6.0 L


 


Mono % (Auto)    7.2


 


Eos % (Auto)    2.1


 


Baso % (Auto)    0.5


 


Neut #    7.7 H


 


Lymph #    0.6 L


 


Mono #    0.7


 


Eos #    0.2


 


Baso #    0.0


 


Neutrophils % (Manual)    85 H


 


Lymphocytes % (Manual)    6 L


 


Monocytes % (Manual)    7


 


Eosinophils % (Manual)    2


 


Platelet Estimate    Normal


 


Large Platelets    Present


 


Hypochromasia (manual)    Slight


 


Poikilocytosis (manual    Slight


 


Anisocytosis (manual)    Slight


 


Target Cells    Slight


 


PT   


 


INR   


 


APTT   


 


D-Dimer, Quantitative   


 


Sodium   


 


Potassium   


 


Chloride   


 


Carbon Dioxide   


 


Anion Gap   


 


BUN   


 


Creatinine   


 


Est GFR ( Amer)   


 


Est GFR (Non-Af Amer)   


 


POC Glucose (mg/dL)  299 H  352 H 


 


Random Glucose   


 


Calcium   


 


Total Bilirubin   


 


AST   


 


ALT   


 


Alkaline Phosphatase   


 


Total Protein   


 


Albumin   


 


Globulin   


 


Albumin/Globulin Ratio   


 


Urine Color   


 


Urine Clarity   


 


Urine pH   


 


Ur Specific Gravity   


 


Urine Protein   


 


Urine Glucose (UA)   


 


Urine Ketones   


 


Urine Blood   


 


Urine Nitrate   


 


Urine Bilirubin   


 


Urine Urobilinogen   


 


Ur Leukocyte Esterase   


 


Urine WBC (Auto)   


 


Urine RBC (Auto)   


 


Ur Squamous Epith Cells   


 


Serum Ketones   














  01/30/18 01/30/18 01/30/18





  12:03 16:12 20:57


 


WBC   


 


RBC   


 


Hgb   


 


Hct   


 


MCV   


 


MCH   


 


MCHC   


 


RDW   


 


Plt Count   


 


MPV   


 


Neut % (Auto)   


 


Lymph % (Auto)   


 


Mono % (Auto)   


 


Eos % (Auto)   


 


Baso % (Auto)   


 


Neut #   


 


Lymph #   


 


Mono #   


 


Eos #   


 


Baso #   


 


Neutrophils % (Manual)   


 


Lymphocytes % (Manual)   


 


Monocytes % (Manual)   


 


Eosinophils % (Manual)   


 


Platelet Estimate   


 


Large Platelets   


 


Hypochromasia (manual)   


 


Poikilocytosis (manual   


 


Anisocytosis (manual)   


 


Target Cells   


 


PT   


 


INR   


 


APTT   


 


D-Dimer, Quantitative   


 


Sodium  128 L  


 


Potassium  3.5 L  


 


Chloride  93 L  


 


Carbon Dioxide  27  


 


Anion Gap  12  


 


BUN  11  


 


Creatinine  1.0  


 


Est GFR ( Amer)  > 60  


 


Est GFR (Non-Af Amer)  > 60  


 


POC Glucose (mg/dL)   423 H*  311 H


 


Random Glucose  394 H  


 


Calcium  9.0  


 


Total Bilirubin  0.7  


 


AST  52  


 


ALT  34  


 


Alkaline Phosphatase  126  D  


 


Total Protein  7.4  


 


Albumin  3.6  


 


Globulin  3.8  


 


Albumin/Globulin Ratio  1.0  


 


Urine Color   


 


Urine Clarity   


 


Urine pH   


 


Ur Specific Gravity   


 


Urine Protein   


 


Urine Glucose (UA)   


 


Urine Ketones   


 


Urine Blood   


 


Urine Nitrate   


 


Urine Bilirubin   


 


Urine Urobilinogen   


 


Ur Leukocyte Esterase   


 


Urine WBC (Auto)   


 


Urine RBC (Auto)   


 


Ur Squamous Epith Cells   


 


Serum Ketones

## 2018-01-31 LAB
ALBUMIN SERPL-MCNC: 3.6 G/DL (ref 3.5–5)
ALBUMIN/GLOB SERPL: 0.9 {RATIO} (ref 1–2.1)
ALT SERPL-CCNC: 42 U/L (ref 21–72)
AST SERPL-CCNC: 33 U/L (ref 17–59)
BASOPHILS # BLD AUTO: 0 K/UL (ref 0–0.2)
BASOPHILS NFR BLD: 0.1 % (ref 0–2)
BASOPHILS NFR BLD: 1 % (ref 0–2)
BUN SERPL-MCNC: 9 MG/DL (ref 9–20)
CALCIUM SERPL-MCNC: 9 MG/DL (ref 8.6–10.4)
EOSINOPHIL # BLD AUTO: 0.5 K/UL (ref 0–0.7)
EOSINOPHIL NFR BLD: 4.3 % (ref 0–4)
EOSINOPHIL NFR BLD: 8 % (ref 0–4)
ERYTHROCYTE [DISTWIDTH] IN BLOOD BY AUTOMATED COUNT: 13.4 % (ref 11.5–14.5)
GFR NON-AFRICAN AMERICAN: > 60
HGB BLD-MCNC: 9.6 G/DL (ref 12–18)
LYMPHOCYTE: 3 % (ref 20–40)
LYMPHOCYTES # BLD AUTO: 0.8 K/UL (ref 1–4.3)
LYMPHOCYTES NFR BLD AUTO: 7.8 % (ref 20–40)
MCH RBC QN AUTO: 29.7 PG (ref 27–31)
MCHC RBC AUTO-ENTMCNC: 34.6 G/DL (ref 33–37)
MCV RBC AUTO: 85.8 FL (ref 80–94)
METAMYELOCYTES NFR BLD: 1 % (ref 0–0)
MONOCYTE: 8 % (ref 0–10)
MONOCYTES # BLD: 0.9 K/UL (ref 0–0.8)
MONOCYTES NFR BLD: 8.6 % (ref 0–10)
NEUTROPHILS # BLD: 8.6 K/UL (ref 1.8–7)
NEUTROPHILS NFR BLD AUTO: 72 % (ref 50–75)
NEUTROPHILS NFR BLD AUTO: 79.2 % (ref 50–75)
NEUTS BAND NFR BLD: 6 % (ref 0–2)
NRBC BLD AUTO-RTO: 0.1 % (ref 0–2)
PLATELET # BLD EST: NORMAL 10*3/UL
PLATELET # BLD: 279 K/UL (ref 130–400)
PMV BLD AUTO: 9.9 FL (ref 7.2–11.7)
RBC # BLD AUTO: 3.22 MIL/UL (ref 4.4–5.9)
RBC MORPH BLD: NORMAL
TOTAL CELLS COUNTED BLD: 100
VARIANT LYMPHS NFR BLD MANUAL: 1 % (ref 0–0)
WBC # BLD AUTO: 10.9 K/UL (ref 4.8–10.8)

## 2018-01-31 RX ADMIN — Medication SCH CAP: at 10:03

## 2018-01-31 RX ADMIN — TAZOBACTAM SODIUM AND PIPERACILLIN SODIUM SCH MLS/HR: 375; 3 INJECTION, SOLUTION INTRAVENOUS at 19:00

## 2018-01-31 RX ADMIN — IPRATROPIUM BROMIDE AND ALBUTEROL SULFATE SCH: .5; 3 SOLUTION RESPIRATORY (INHALATION) at 07:26

## 2018-01-31 RX ADMIN — GUAIFENESIN AND DEXTROMETHORPHAN SCH ML: 100; 10 SYRUP ORAL at 11:40

## 2018-01-31 RX ADMIN — Medication SCH CAP: at 17:54

## 2018-01-31 RX ADMIN — ENOXAPARIN SODIUM SCH MG: 30 INJECTION SUBCUTANEOUS at 21:51

## 2018-01-31 RX ADMIN — SODIUM CHLORIDE SCH MLS/HR: 9 INJECTION, SOLUTION INTRAVENOUS at 10:24

## 2018-01-31 RX ADMIN — HUMAN INSULIN SCH UNIT: 100 INJECTION, SOLUTION SUBCUTANEOUS at 08:03

## 2018-01-31 RX ADMIN — GUAIFENESIN AND DEXTROMETHORPHAN SCH ML: 100; 10 SYRUP ORAL at 17:54

## 2018-01-31 RX ADMIN — GUAIFENESIN AND DEXTROMETHORPHAN SCH ML: 100; 10 SYRUP ORAL at 00:04

## 2018-01-31 RX ADMIN — VANCOMYCIN HYDROCHLORIDE SCH MLS/HR: 500 INJECTION, POWDER, LYOPHILIZED, FOR SOLUTION INTRAVENOUS at 00:01

## 2018-01-31 RX ADMIN — HUMAN INSULIN SCH UNIT: 100 INJECTION, SOLUTION SUBCUTANEOUS at 16:37

## 2018-01-31 RX ADMIN — IPRATROPIUM BROMIDE AND ALBUTEROL SULFATE SCH ML: .5; 3 SOLUTION RESPIRATORY (INHALATION) at 19:35

## 2018-01-31 RX ADMIN — GUAIFENESIN AND DEXTROMETHORPHAN SCH: 100; 10 SYRUP ORAL at 06:00

## 2018-01-31 RX ADMIN — IPRATROPIUM BROMIDE AND ALBUTEROL SULFATE SCH ML: .5; 3 SOLUTION RESPIRATORY (INHALATION) at 01:08

## 2018-01-31 RX ADMIN — IPRATROPIUM BROMIDE AND ALBUTEROL SULFATE SCH: .5; 3 SOLUTION RESPIRATORY (INHALATION) at 13:08

## 2018-01-31 RX ADMIN — VANCOMYCIN HYDROCHLORIDE SCH MLS/HR: 500 INJECTION, POWDER, LYOPHILIZED, FOR SOLUTION INTRAVENOUS at 11:03

## 2018-01-31 RX ADMIN — ENOXAPARIN SODIUM SCH MG: 30 INJECTION SUBCUTANEOUS at 10:04

## 2018-01-31 RX ADMIN — HUMAN INSULIN SCH UNIT: 100 INJECTION, SOLUTION SUBCUTANEOUS at 11:41

## 2018-01-31 RX ADMIN — HUMAN INSULIN SCH UNIT: 100 INJECTION, SOLUTION SUBCUTANEOUS at 21:55

## 2018-01-31 RX ADMIN — SODIUM CHLORIDE SCH MLS/HR: 9 INJECTION, SOLUTION INTRAVENOUS at 02:02

## 2018-01-31 NOTE — CP.PCM.PN
Subjective





- Date & Time of Evaluation


Date of Evaluation: 01/31/18


Time of Evaluation: 14:12





- Subjective


Subjective: 





CHIEF COMPLAINTS TODAY :


LEFT CALF PAIN 


LOW GRADE TEMP 





ROS.


HEENT :  N.


Resp :       No cough, wheezing ,pleuritic CP ,or hemoptysis 


Cardio :     No anginal  CP, PND, orthopnea, palpitation 


GI :           No abd.pain, n/v ,diarrhea or GI bleeding .


CNS : No headache, vertigo, focal deficit.


Musculoskel :  No joint swelling ,


Derm :        No rash 


Psych :     Normal affect.


Ext :  No  swelling ,  POS L calf pain 





PE.


Pt. is alert awake in no distress.


V.S  As noted in the chart 


Head ,ear nose,throat and eyes : Normal.


Neck : Supple with normal carotids.


Lungs: Clear air entry.


Heart : S1 & S2 normal with S4. No murmur.


Abd : Soft non tender with normal bowel sounds.


Neuro : Moves all ext. with no localized deficit.


Ext : KRISTY CH. LYMPHEDEMA WITH SWOLLEN  TENDER LEFT CALF 


Derm : No rashes or decubitus ulcer.





LABS/RADIOLOGY: DOPPLER NEG DVT 


SUGARS ARE UP 





ASSESSMENT/PLAN : 


ADD METFORMIN 


IV AB 











Objective





- Vital Signs/Intake and Output


Vital Signs (last 24 hours): 


 











Temp Pulse Resp BP Pulse Ox


 


 98.7 F   100 H  20   130/70   95 


 


 01/31/18 11:04  01/31/18 08:51  01/31/18 08:51  01/31/18 08:51  01/31/18 08:51











- Medications


Medications: 


 Current Medications





Acetaminophen (Tylenol 325mg Tab)  650 mg PO Q6 PRN


   PRN Reason: FEVER/  PAIN


   Last Admin: 01/31/18 10:04 Dose:  650 mg


Albuterol/Ipratropium (Duoneb 3 Mg/0.5 Mg (3 Ml) Ud)  3 ml INH RQ6 [facility]


   Last Admin: 01/31/18 13:08 Dose:  Not Given


Bumetanide (Bumex)  1 mg PO DAILY [facility]


   Last Admin: 01/31/18 10:04 Dose:  1 mg


Enoxaparin Sodium (Lovenox)  30 mg SC Q12 [facility]


   Last Admin: 01/31/18 10:04 Dose:  30 mg


Guaifenesin/Dextromethorphan (Robitussin Dm)  5 ml PO Q6 [facility]


   Last Admin: 01/31/18 11:40 Dose:  5 ml


Piperacillin Sod/Tazobactam (Sod 3.375 gm/ Sodium Chloride)  100 mls @ 200 mls/

hr IVPB Q8H [facility]


   Stop: 01/31/18 15:00


   Last Admin: 01/31/18 10:24 Dose:  200 mls/hr


Vancomycin HCl 1,250 mg/ (Dextrose)  250 mls @ 120 mls/hr IVPB Q12H [facility]


   Last Admin: 01/31/18 11:03 Dose:  120 mls/hr


Piperacillin Sod/Tazobactam Sod (Zosyn 3.375 Gm Iv Premix)  3.375 gm in 50 mls 

@ 200 mls/hr IVPB Q8H [facility]


Insulin Human Regular (Novolin R)  0 unit SC ACHS [facility]


   PRN Reason: Protocol


   Last Admin: 01/31/18 11:41 Dose:  6 unit


Lactobacillus Acidophilus (Bacid Acidophilus)  1 cap PO BID [facility]


   Last Admin: 01/31/18 10:03 Dose:  1 cap


Lisinopril (Zestril)  40 mg PO DAILY [facility]


   Last Admin: 01/31/18 10:05 Dose:  40 mg


Pneumococcal Polyvalent Vaccine (Pneumovax 23 Vaccine)  0.5 ml IM .ONCE ONE


   Stop: 02/01/18 10:01











- Labs


Labs: 


 





 01/31/18 08:33 





 01/31/18 08:33 





 











PT  13.4 SECONDS (9.7-12.2)  H  01/29/18  22:39    


 


INR  1.2   01/29/18  22:39    


 


APTT  22 SECONDS (21-34)   01/29/18  22:39

## 2018-01-31 NOTE — CP.PCM.PN
Subjective





- Date & Time of Evaluation


Date of Evaluation: 01/31/18


Time of Evaluation: 13:38





- Subjective


Subjective: 





CHIEF COMPLAINTS TODAY :


LEFT CALF PAIN 


LOW GRADE TEMP 


BLOOD SUGAR MORE THAN 400.





ROS.


HEENT :  N.


Resp :       No cough, wheezing ,pleuritic CP ,or hemoptysis 


Cardio :     No anginal  CP, PND, orthopnea, palpitation 


GI :           No abd.pain, n/v ,diarrhea or GI bleeding .


CNS : No headache, vertigo, focal deficit.


Musculoskel :  No joint swelling ,


Derm :        No rash 


Psych :     Normal affect.


Ext :  +VE SWELLING ,  POS L CALF PAIN.





PE.


Pt. is alert awake in no distress.


V.S  As noted in the chart 


Head ,ear nose,throat and eyes : Normal.


Neck : Supple with normal carotids.


Lungs: Clear air entry.


Heart : S1 & S2 normal with S4. No murmur.


Abd : Soft non tender with normal bowel sounds.


Neuro : Moves all ext. with no localized deficit.


Ext : KRISTY CH. LYMPHEDEMA WITH SWOLLEN  TENDER LEFT CALF  > RT.


Derm : No rashes or decubitus ulcer.





LABS/RADIOLOGY: 


DOPPLER NEG DVT 


wbc 10.9.


SUGARS 425


CREAT 1.1 / BUN 9





LFTS NORMAL


BLOOD CULTURES 1/29/18 -VE GROWTH SO FAR.





ASSESSMENT;


LEFT LOWER EXTREMITY CELLULITIS?STAPH/OR STREP


BILATERAL LOWER EXTREMITY LYMPHEDEMA..


NEW ONSET DIABETES MELLITUS.


MORBID OBESITY.


ALTAF





/PLAN : 


CONTINUE iv zOSYN 3.375 EVERY 8 HOURLY 1/29/18.


CONTINUE iv VANCOMYCIN 1250 MG iv EVERY 12 HOURLY 1/29/18.


fOLLOW-UP VANCO TROUGH LEVEL PRIOR TO THE FOURTH DOSE AND KEEP BETWEEN 10 AND 

20.


ADEQUATE CONTROL OF SUGARS


ADDED METFORMIN  BY PMD.








Objective





- Vital Signs/Intake and Output


Vital Signs (last 24 hours): 


 











Temp Pulse Resp BP Pulse Ox


 


 98.7 F   100 H  20   130/70   95 


 


 01/31/18 11:04  01/31/18 08:51  01/31/18 08:51  01/31/18 08:51  01/31/18 08:51











- Medications


Medications: 


 Current Medications





Acetaminophen (Tylenol 325mg Tab)  650 mg PO Q6 PRN


   PRN Reason: FEVER/  PAIN


   Last Admin: 01/31/18 10:04 Dose:  650 mg


Albuterol/Ipratropium (Duoneb 3 Mg/0.5 Mg (3 Ml) Ud)  3 ml INH RQ6 [facility]


   Last Admin: 01/31/18 13:08 Dose:  Not Given


Bumetanide (Bumex)  1 mg PO DAILY [facility]


   Last Admin: 01/31/18 10:04 Dose:  1 mg


Enoxaparin Sodium (Lovenox)  30 mg SC Q12 [facility]


   Last Admin: 01/31/18 10:04 Dose:  30 mg


Guaifenesin/Dextromethorphan (Robitussin Dm)  5 ml PO Q6 [facility]


   Last Admin: 01/31/18 11:40 Dose:  5 ml


Piperacillin Sod/Tazobactam (Sod 3.375 gm/ Sodium Chloride)  100 mls @ 200 mls/

hr IVPB Q8H [facility]


   Stop: 01/31/18 15:00


   Last Admin: 01/31/18 10:24 Dose:  200 mls/hr


Vancomycin HCl 1,250 mg/ (Dextrose)  250 mls @ 120 mls/hr IVPB Q12H [facility]


   Last Admin: 01/31/18 11:03 Dose:  120 mls/hr


Piperacillin Sod/Tazobactam Sod (Zosyn 3.375 Gm Iv Premix)  3.375 gm in 50 mls 

@ 200 mls/hr IVPB Q8H [facility]


Insulin Human Regular (Novolin R)  0 unit SC ACHS [facility]


   PRN Reason: Protocol


   Last Admin: 01/31/18 11:41 Dose:  6 unit


Lactobacillus Acidophilus (Bacid Acidophilus)  1 cap PO BID [facility]


   Last Admin: 01/31/18 10:03 Dose:  1 cap


Lisinopril (Zestril)  40 mg PO DAILY [facility]


   Last Admin: 01/31/18 10:05 Dose:  40 mg


Pneumococcal Polyvalent Vaccine (Pneumovax 23 Vaccine)  0.5 ml IM .ONCE ONE


   Stop: 02/01/18 10:01











- Labs


Labs: 


 





 01/31/18 08:33 





 01/31/18 08:33 





 











PT  13.4 SECONDS (9.7-12.2)  H  01/29/18  22:39    


 


INR  1.2   01/29/18  22:39    


 


APTT  22 SECONDS (21-34)   01/29/18  22:39

## 2018-01-31 NOTE — CON
DATE:  01/30/2018



INFECTIOUS DISEASE CONSULTATION



REQUESTED BY:  Giovany Walker MD.



REASON FOR CONSULTATION:  Extensive cellulitis, left leg and elevated

temperatures.



HISTORY OF PRESENT ILLNESS:  The patient is a 37-year-old Afro-American

male who is well-known to me with history of hypertension, moderate

obesity, and previous history of Staphylococcus infections of leg secondary

to chronic lymphedema, who presents from the private MD's office because of

increasing cellulitis and swelling of the left calf with outpatient failure

of p.o. antibiotics.  As reported, the patient a few days ago, presented to

the MD with flu-like symptoms and left calf pain and swelling.  The patient

was given Tamiflu, but presented the next day to [facility] ER, where

he was given p.o. Keflex.  The patient took antibiotics, but did not

improve and the swelling got worse associated with diaphoresis, fevers, and

fatigue.  The patient was also found to have new onset of high blood sugars

in the ER.  Infectious Disease consultation was requested therefore for

extensive cellulitis and hyperpyrexia.  The patient has been spiking

temperature of 101.6 in the hospital since his admission.



PAST MEDICAL HISTORY:  As above, history of hypertension, history of morbid

obesity, also a history of seizures, history of Streptococci, and

cellulitis associated with Staphylococcus infections and chronic

lymphedema.



PERSONAL HISTORY:  The patient denies any history of smoking or drinking. 

Denies any recent travel.



ALLERGIES:  NONE KNOWN.



FAMILY HISTORY:  Unremarkable.



REVIEW OF SYSTEMS:

GENERAL:  Presently, complains of chills and pain on ambulating in the left

lower extremity.  Also, complains of generalized fatigue and hyperpyrexia.

RESPIRATORY:  Presently,complains of cough, but no expectoration.  Also,

complains of snoring at night and increased daytime somnolence.

CARDIOVASCULAR:  Denies any chest pains or palpitations.  Positive for

edema of the left leg more than the right.  Induration noted on both calf,

left more than right.

CENTRAL NERVOUS SYSTEM:  Denies any seizures or tremors.  No history of

headaches or syncopal episodes.

GASTROINTESTINAL:  Unremarkable presently.

GENITOURINARY:  Unremarkable presently.

MUSCULOSKELETAL:  Complains of left calf pain.



PHYSICAL EXAMINATION:

GENERAL:  The patient is awake and alert, slightly fatigued.

VITAL SIGNS:  T-max of 101.6, blood pressure 143/65, respirations 20, pulse

97 per minute, and pulse ox is 96% on room air.

HEENT:  Pupils are equal and reactive to light and accommodation. 

Extraocular movements are full.  Fundus is negative.  Sclerae nonicteric. 

Conjunctivae normal.  JVP not elevated.

NECK:  Appears to be supple.  No thyromegaly noted.  No lymphadenopathy

appreciated.  JVP is flat.

LUNGS:  A few rhonchi, but otherwise unremarkable.

CARDIOVASCULAR SYSTEM:  S1 and S2 normal.  No murmur or gallop.

ABDOMEN:  Soft, obese.  Bowel sounds are present.  No organomegaly

appreciated.

EXTREMITIES:  Left calf is much swollen than the right calf with warmth and

tenderness to touch, chronic lymphedema in both lower extremities.  Homans

sign is absent.  No pitting edema, calf tenderness, or skin color changes

seen.



LABORATORY DATA:  WBCs presently 9.1, hemoglobin of 10.3, and platelets

245.  Creatinine 1.0, BUN of 11, sodium 128, blood sugars have been

elevated at 446 and 394.  Liver function tests are normal.  Urinalysis is

unremarkable, serum ketones negative.



IMPRESSION:

1.  Left leg cellulitis with chronic lymphedema.

2.  Hypertension.

3.  New onset of diabetes mellitus.

4.  Morbid obesity.

5.  Obstructive sleep apnea.



PLAN:  Suggest pan cultures.  We will get sed rate, C-reactive proteins. 

Continue Zosyn 3.375 g IV piggyback q. 8 hourly, started on 01/29/2018. 

Also, continue vancomycin 1250 mg IV piggyback q. 12 hourly, started on

01/29/2018.  We will follow vancomycin trough levels prior to the fourth

dose.  Follow up renal functions closely.  We will review chest x-ray. 

Cough expectorant as requested by the patient.



We will follow along with you, follow up cultures to adjust antibiotics.



Thank you very much for allowing me to participate in the care of your

patient.  We will follow up with you.





__________________________________________

Leonela Arthur MD



DD:  01/31/2018 0:27:04

DT:  01/31/2018 3:24:41

Job # [id number]

## 2018-02-01 RX ADMIN — VANCOMYCIN HYDROCHLORIDE SCH MLS/HR: 500 INJECTION, POWDER, LYOPHILIZED, FOR SOLUTION INTRAVENOUS at 12:59

## 2018-02-01 RX ADMIN — GUAIFENESIN AND DEXTROMETHORPHAN SCH ML: 100; 10 SYRUP ORAL at 18:00

## 2018-02-01 RX ADMIN — IPRATROPIUM BROMIDE AND ALBUTEROL SULFATE SCH: .5; 3 SOLUTION RESPIRATORY (INHALATION) at 13:16

## 2018-02-01 RX ADMIN — HUMAN INSULIN SCH UNIT: 100 INJECTION, SOLUTION SUBCUTANEOUS at 08:08

## 2018-02-01 RX ADMIN — HUMAN INSULIN SCH UNIT: 100 INJECTION, SOLUTION SUBCUTANEOUS at 12:19

## 2018-02-01 RX ADMIN — IPRATROPIUM BROMIDE AND ALBUTEROL SULFATE SCH: .5; 3 SOLUTION RESPIRATORY (INHALATION) at 20:18

## 2018-02-01 RX ADMIN — Medication SCH CAP: at 17:38

## 2018-02-01 RX ADMIN — Medication SCH CAP: at 10:12

## 2018-02-01 RX ADMIN — ENOXAPARIN SODIUM SCH MG: 30 INJECTION SUBCUTANEOUS at 21:09

## 2018-02-01 RX ADMIN — VANCOMYCIN HYDROCHLORIDE SCH MLS/HR: 500 INJECTION, POWDER, LYOPHILIZED, FOR SOLUTION INTRAVENOUS at 01:05

## 2018-02-01 RX ADMIN — TAZOBACTAM SODIUM AND PIPERACILLIN SODIUM SCH MLS/HR: 375; 3 INJECTION, SOLUTION INTRAVENOUS at 02:19

## 2018-02-01 RX ADMIN — GUAIFENESIN AND DEXTROMETHORPHAN SCH ML: 100; 10 SYRUP ORAL at 00:24

## 2018-02-01 RX ADMIN — IPRATROPIUM BROMIDE AND ALBUTEROL SULFATE SCH: .5; 3 SOLUTION RESPIRATORY (INHALATION) at 01:26

## 2018-02-01 RX ADMIN — GUAIFENESIN AND DEXTROMETHORPHAN SCH ML: 100; 10 SYRUP ORAL at 12:20

## 2018-02-01 RX ADMIN — GUAIFENESIN AND DEXTROMETHORPHAN SCH ML: 100; 10 SYRUP ORAL at 05:40

## 2018-02-01 RX ADMIN — VANCOMYCIN HYDROCHLORIDE SCH: 500 INJECTION, POWDER, LYOPHILIZED, FOR SOLUTION INTRAVENOUS at 12:24

## 2018-02-01 RX ADMIN — HUMAN INSULIN SCH UNIT: 100 INJECTION, SOLUTION SUBCUTANEOUS at 16:30

## 2018-02-01 RX ADMIN — HUMAN INSULIN SCH: 100 INJECTION, SOLUTION SUBCUTANEOUS at 22:00

## 2018-02-01 RX ADMIN — TAZOBACTAM SODIUM AND PIPERACILLIN SODIUM SCH: 375; 3 INJECTION, SOLUTION INTRAVENOUS at 10:21

## 2018-02-01 RX ADMIN — ENOXAPARIN SODIUM SCH MG: 30 INJECTION SUBCUTANEOUS at 10:20

## 2018-02-01 RX ADMIN — IPRATROPIUM BROMIDE AND ALBUTEROL SULFATE SCH: .5; 3 SOLUTION RESPIRATORY (INHALATION) at 07:32

## 2018-02-01 NOTE — CP.PCM.PN
Subjective





- Date & Time of Evaluation


Date of Evaluation: 02/01/18


Time of Evaluation: 22:21





- Subjective


Subjective: 





CHIEF COMPLAINTS TODAY :


SPIKED A TEMPERATURE  AS REPORTED BY RN .MS STROUD


c/o generalized itching


generalized maculo-papular rash on trunk abdomen and arms and lower extremities 

noted ? zosyn ? 





ROS.


HEENT :  N.


Resp :       No cough, wheezing ,pleuritic CP ,or hemoptysis 


Cardio :     No anginal  CP, PND, orthopnea, palpitation 


GI :           No abd.pain, n/v ,diarrhea or GI bleeding .


CNS : No headache, vertigo, focal deficit.


Musculoskel :  No joint swelling ,


Derm :      generalized maculo-papular rash on trunk abdomen and arms and lower 

extremities noted ? zosyn


  


Psych :     Normal affect.


Ext :  +VE SWELLING ,  POS L CALF PAIN.





PE.


Pt. is alert awake in no distress.


V.S  As noted in the chart 


Head ,ear nose,throat and eyes : Normal.


Neck : Supple with normal carotids.


Lungs: Clear air entry.


Heart : S1 & S2 normal with S4. No murmur.


Abd : Soft non tender with normal bowel sounds.


Neuro : Moves all ext. with no localized deficit.


Ext : KRISTY CH. LYMPHEDEMA WITH SWOLLEN  TENDER LEFT CALF  > RT.


Derm :  generalized maculo-papular rash on trunk abdomen and arms and lower 

extremities noted ? zosyn


  





LABS/RADIOLOGY: 


DOPPLER NEG DVT 


wbc 10.9.


SUGARS 425


CREAT 1.1 / BUN 9





LFTS NORMAL


BLOOD CULTURES 1/29/18 -VE GROWTH SO FAR.





ASSESSMENT;


GENERALIZED RASH : ? ZOSYN


LEFT LOWER EXTREMITY CELLULITIS?STAPH/OR STREP


BILATERAL LOWER EXTREMITY LYMPHEDEMA..


NEW ONSET DIABETES MELLITUS.


MORBID OBESITY.


ALTAF





/PLAN : 


BLOOD CULTURES 2 15 MINUTES APART STAT.


DC  IV ZOSYN 3.375 EVERY 8 HOURLY 1/29/18.


CONTINUE iv VANCOMYCIN 1250 MG iv EVERY 12 HOURLY 1/29/18.


fOLLOW-UP VANCO TROUGH LEVEL PRIOR TO THE FOURTH DOSE AND KEEP BETWEEN 10 AND 

20.


bENADRYL 25 MG BY MOUTH WHEN NECESSARY EVERY 6 HOURLY FOR ITCHING.


ADEQUATE CONTROL OF SUGARS


ADDED METFORMIN  BY PMD.








Objective





- Vital Signs/Intake and Output


Vital Signs (last 24 hours): 


 











Temp Pulse Resp BP Pulse Ox


 


 98.7 F   97 H  20   166/90 H  98 


 


 02/01/18 15:00  02/01/18 15:00  02/01/18 15:00  02/01/18 15:00  02/01/18 15:00








Intake and Output: 


 











 02/01/18 02/02/18





 18:59 06:59


 


Intake Total 1050 


 


Balance 1050 














- Medications


Medications: 


 Current Medications





Acetaminophen (Tylenol 325mg Tab)  650 mg PO Q6 PRN


   PRN Reason: FEVER/  PAIN


   Last Admin: 01/31/18 10:04 Dose:  650 mg


Albuterol/Ipratropium (Duoneb 3 Mg/0.5 Mg (3 Ml) Ud)  3 ml INH RQ6 [facility]


   Last Admin: 02/01/18 20:18 Dose:  Not Given


Bumetanide (Bumex)  1 mg PO DAILY [facility]


   Last Admin: 02/01/18 12:19 Dose:  1 mg


Diphenhydramine HCl (Benadryl)  25 mg PO Q6 PRN


   PRN Reason: Itching / Pruritus


Enoxaparin Sodium (Lovenox)  30 mg SC Q12 [facility]


   Last Admin: 02/01/18 21:09 Dose:  30 mg


Glipizide (Glucotrol)  5 mg PO ACB [facility]


   Last Admin: 02/01/18 10:19 Dose:  5 mg


Guaifenesin/Dextromethorphan (Robitussin Dm)  5 ml PO Q6 [facility]


   Last Admin: 02/01/18 12:20 Dose:  5 ml


Vancomycin HCl 1,250 mg/ (Dextrose)  250 mls @ 120 mls/hr IVPB Q12H [facility]


   Last Admin: 02/01/18 12:59 Dose:  120 mls/hr


Piperacillin Sod/Tazobactam Sod (Zosyn 3.375 Gm Iv Premix)  3.375 gm in 50 mls 

@ 200 mls/hr IVPB Q8H [facility]


   Last Admin: 02/01/18 10:21 Dose:  Not Given


Insulin Human Regular (Novolin R)  0 unit SC ACHS [facility]


   PRN Reason: Protocol


   Last Admin: 02/01/18 16:30 Dose:  2 unit


Lactobacillus Acidophilus (Bacid Acidophilus)  1 cap PO BID [facility]


   Last Admin: 02/01/18 17:38 Dose:  1 cap


Lisinopril (Zestril)  40 mg PO DAILY [facility]


   Last Admin: 02/01/18 10:19 Dose:  40 mg


Metformin HCl (Glucophage)  1,000 mg PO BIDCC [facility]


   Last Admin: 02/01/18 17:31 Dose:  1,000 mg











- Labs


Labs: 


 





 01/31/18 08:33 





 01/31/18 08:33 





 











PT  13.4 SECONDS (9.7-12.2)  H  01/29/18  22:39    


 


INR  1.2   01/29/18  22:39    


 


APTT  22 SECONDS (21-34)   01/29/18  22:39

## 2018-02-01 NOTE — NM
COMPARISON:

01/30/2018 single-view chest



TECHNIQUE:

8.8 mCi technetium 99-m  Xe-133 Gas. 



4.3 mCI technetium 99-m MAA administered intravenously.



FINDINGS:



VENTILATION COMPONENT:

Heterogeneous ventilation particularly in the left upper lobe



PERFUSION COMPONENT:

Heterogeneous profusion, matched finding in the left upper lobe.



IMPRESSION:

Matched heterogeneous ventilation and perfusion defects left upper 

lobe. 



Low probability ventilation perfusion scan for pulmonary embolism.

## 2018-02-01 NOTE — CP.PCM.PN
Subjective





- Date & Time of Evaluation


Date of Evaluation: 02/01/18


Time of Evaluation: 13:56





- Subjective


Subjective: 





CHIEF COMPLAINTS TODAY :


LEFT CALF PAIN 


LOW GRADE TEMP 





ROS.


HEENT :  N.


Resp :       No cough, wheezing ,pleuritic CP ,or hemoptysis 


Cardio :     No anginal  CP, PND, orthopnea, palpitation 


GI :           No abd.pain, n/v ,diarrhea or GI bleeding .


CNS : No headache, vertigo, focal deficit.


Musculoskel :  No joint swelling ,


Derm :        No rash 


Psych :     Normal affect.


Ext :  No  swelling ,  POS L calf pain 





PE.


Pt. is alert awake in no distress.


V.S  As noted in the chart 


Head ,ear nose,throat and eyes : Normal.


Neck : Supple with normal carotids.


Lungs: Clear air entry.


Heart : S1 & S2 normal with S4. No murmur.


Abd : Soft non tender with normal bowel sounds.


Neuro : Moves all ext. with no localized deficit.


Ext : KRISTY CH. LYMPHEDEMA WITH SWOLLEN  TENDER LEFT CALF 


Derm : No rashes or decubitus ulcer.





LABS/RADIOLOGY: DOPPLER NEG DVT 


SUGARS ARE UP 





ASSESSMENT/PLAN : 


ADD METFORMIN + GLIPIZIDE 


IV AB 





Objective





- Vital Signs/Intake and Output


Vital Signs (last 24 hours): 


 











Temp Pulse Resp BP Pulse Ox


 


 99.7 F H  90   20   123/75   95 


 


 02/01/18 08:06  02/01/18 08:06  02/01/18 08:06  02/01/18 08:06  02/01/18 08:06








Intake and Output: 


 











 02/01/18 02/01/18





 11:59 23:59


 


Intake Total 490 


 


Balance 490 














- Medications


Medications: 


 Current Medications





Acetaminophen (Tylenol 325mg Tab)  650 mg PO Q6 PRN


   PRN Reason: FEVER/  PAIN


   Last Admin: 01/31/18 10:04 Dose:  650 mg


Albuterol/Ipratropium (Duoneb 3 Mg/0.5 Mg (3 Ml) Ud)  3 ml INH RQ6 [facility]


   Last Admin: 02/01/18 13:16 Dose:  Not Given


Bumetanide (Bumex)  1 mg PO DAILY [facility]


   Last Admin: 02/01/18 12:19 Dose:  1 mg


Diphenhydramine HCl (Benadryl)  25 mg PO Q6 PRN


   PRN Reason: Itching / Pruritus


Enoxaparin Sodium (Lovenox)  30 mg SC Q12 [facility]


   Last Admin: 02/01/18 10:20 Dose:  30 mg


Glipizide (Glucotrol)  5 mg PO ACB [facility]


   Last Admin: 02/01/18 10:19 Dose:  5 mg


Guaifenesin/Dextromethorphan (Robitussin Dm)  5 ml PO Q6 [facility]


   Last Admin: 02/01/18 12:20 Dose:  5 ml


Vancomycin HCl 1,250 mg/ (Dextrose)  250 mls @ 120 mls/hr IVPB Q12H [facility]


   Last Admin: 02/01/18 12:59 Dose:  120 mls/hr


Piperacillin Sod/Tazobactam Sod (Zosyn 3.375 Gm Iv Premix)  3.375 gm in 50 mls 

@ 200 mls/hr IVPB Q8H [facility]


   Last Admin: 02/01/18 10:21 Dose:  Not Given


Insulin Human Regular (Novolin R)  0 unit SC ACHS [facility]


   PRN Reason: Protocol


   Last Admin: 02/01/18 12:19 Dose:  6 unit


Lactobacillus Acidophilus (Bacid Acidophilus)  1 cap PO BID [facility]


   Last Admin: 02/01/18 10:12 Dose:  1 cap


Lisinopril (Zestril)  40 mg PO DAILY [facility]


   Last Admin: 02/01/18 10:19 Dose:  40 mg


Metformin HCl (Glucophage)  1,000 mg PO BIDCC [facility]











- Labs


Labs: 


 





 01/31/18 08:33 





 01/31/18 08:33 





 











PT  13.4 SECONDS (9.7-12.2)  H  01/29/18  22:39    


 


INR  1.2   01/29/18  22:39    


 


APTT  22 SECONDS (21-34)   01/29/18  22:39

## 2018-02-02 RX ADMIN — IPRATROPIUM BROMIDE AND ALBUTEROL SULFATE SCH: .5; 3 SOLUTION RESPIRATORY (INHALATION) at 07:39

## 2018-02-02 RX ADMIN — VANCOMYCIN HYDROCHLORIDE SCH MLS/HR: 500 INJECTION, POWDER, LYOPHILIZED, FOR SOLUTION INTRAVENOUS at 10:49

## 2018-02-02 RX ADMIN — GUAIFENESIN AND DEXTROMETHORPHAN SCH ML: 100; 10 SYRUP ORAL at 11:33

## 2018-02-02 RX ADMIN — Medication SCH CAP: at 10:01

## 2018-02-02 RX ADMIN — VANCOMYCIN HYDROCHLORIDE SCH MLS/HR: 500 INJECTION, POWDER, LYOPHILIZED, FOR SOLUTION INTRAVENOUS at 00:09

## 2018-02-02 RX ADMIN — HUMAN INSULIN SCH: 100 INJECTION, SOLUTION SUBCUTANEOUS at 21:32

## 2018-02-02 RX ADMIN — IPRATROPIUM BROMIDE AND ALBUTEROL SULFATE SCH: .5; 3 SOLUTION RESPIRATORY (INHALATION) at 13:13

## 2018-02-02 RX ADMIN — ENOXAPARIN SODIUM SCH MG: 30 INJECTION SUBCUTANEOUS at 21:14

## 2018-02-02 RX ADMIN — GUAIFENESIN AND DEXTROMETHORPHAN SCH ML: 100; 10 SYRUP ORAL at 17:43

## 2018-02-02 RX ADMIN — IPRATROPIUM BROMIDE AND ALBUTEROL SULFATE SCH: .5; 3 SOLUTION RESPIRATORY (INHALATION) at 01:44

## 2018-02-02 RX ADMIN — HUMAN INSULIN SCH UNIT: 100 INJECTION, SOLUTION SUBCUTANEOUS at 11:31

## 2018-02-02 RX ADMIN — HUMAN INSULIN SCH UNIT: 100 INJECTION, SOLUTION SUBCUTANEOUS at 08:25

## 2018-02-02 RX ADMIN — HUMAN INSULIN SCH UNIT: 100 INJECTION, SOLUTION SUBCUTANEOUS at 17:07

## 2018-02-02 RX ADMIN — Medication SCH CAP: at 17:43

## 2018-02-02 RX ADMIN — ENOXAPARIN SODIUM SCH MG: 30 INJECTION SUBCUTANEOUS at 10:01

## 2018-02-02 RX ADMIN — GUAIFENESIN AND DEXTROMETHORPHAN SCH ML: 100; 10 SYRUP ORAL at 05:21

## 2018-02-02 RX ADMIN — HYDROCORTISONE SCH APPLIC: 10 CREAM TOPICAL at 17:50

## 2018-02-02 RX ADMIN — IPRATROPIUM BROMIDE AND ALBUTEROL SULFATE SCH: .5; 3 SOLUTION RESPIRATORY (INHALATION) at 19:42

## 2018-02-02 RX ADMIN — GUAIFENESIN AND DEXTROMETHORPHAN SCH ML: 100; 10 SYRUP ORAL at 00:23

## 2018-02-02 NOTE — CP.PCM.PN
Subjective





- Date & Time of Evaluation


Date of Evaluation: 02/02/18


Time of Evaluation: 14:03





- Subjective


Subjective: 





CHIEF COMPLAINTS TODAY :


LEFT CALF PAIN 


LOW GRADE TEMP 





ROS.


HEENT :  N.


Resp :       No cough, wheezing ,pleuritic CP ,or hemoptysis 


Cardio :     No anginal  CP, PND, orthopnea, palpitation 


GI :           No abd.pain, n/v ,diarrhea or GI bleeding .


CNS : No headache, vertigo, focal deficit.


Musculoskel :  No joint swelling ,


Derm :        No rash 


Psych :     Normal affect.


Ext :  No  swelling ,  POS L calf pain 





PE.


Pt. is alert awake in no distress.


V.S  As noted in the chart 


Head ,ear nose,throat and eyes : Normal.


Neck : Supple with normal carotids.


Lungs: Clear air entry.


Heart : S1 & S2 normal with S4. No murmur.


Abd : Soft non tender with normal bowel sounds.


Neuro : Moves all ext. with no localized deficit.


Ext : KRISTY CH. LYMPHEDEMA WITH SWOLLEN  TENDER LEFT CALF 


Derm : No rashes or decubitus ulcer.





LABS/RADIOLOGY: DOPPLER NEG DVT 


SUGARS ARE UP 





ASSESSMENT/PLAN : 


ADD METFORMIN + GLIPIZIDE INCREASE DOSE 


IV AB 





Objective





- Vital Signs/Intake and Output


Vital Signs (last 24 hours): 


 











Temp Pulse Resp BP Pulse Ox


 


 99.9 F H  98 H  20   158/85 H  96 


 


 02/02/18 00:00  02/02/18 00:00  02/02/18 00:00  02/02/18 00:00  02/02/18 00:00








Intake and Output: 


 











 02/02/18 02/02/18





 11:59 23:59


 


Intake Total 490 


 


Balance 490 














- Medications


Medications: 


 Current Medications





Acetaminophen (Tylenol 325mg Tab)  650 mg PO Q6 PRN


   PRN Reason: FEVER/  PAIN


   Last Admin: 01/31/18 10:04 Dose:  650 mg


Albuterol/Ipratropium (Duoneb 3 Mg/0.5 Mg (3 Ml) Ud)  3 ml INH RQ6 [facility]


   Last Admin: 02/02/18 13:13 Dose:  Not Given


Bumetanide (Bumex)  1 mg PO DAILY [facility]


   Last Admin: 02/02/18 10:01 Dose:  1 mg


Diphenhydramine HCl (Benadryl)  25 mg PO Q6 PRN


   PRN Reason: Itching / Pruritus


   Last Admin: 02/02/18 00:39 Dose:  25 mg


Enoxaparin Sodium (Lovenox)  30 mg SC Q12 [facility]


   Last Admin: 02/02/18 10:01 Dose:  30 mg


Glipizide (Glucotrol)  5 mg PO ACB [facility]


   Last Admin: 02/02/18 08:33 Dose:  5 mg


Guaifenesin/Dextromethorphan (Robitussin Dm)  5 ml PO Q6 [facility]


   Last Admin: 02/02/18 11:33 Dose:  5 ml


Vancomycin HCl 1,250 mg/ (Dextrose)  250 mls @ 120 mls/hr IVPB Q12H [facility]


   Last Admin: 02/02/18 10:49 Dose:  120 mls/hr


Insulin Human Regular (Novolin R)  0 unit SC ACHS [facility]


   PRN Reason: Protocol


   Last Admin: 02/02/18 11:31 Dose:  4 unit


Lactobacillus Acidophilus (Bacid Acidophilus)  1 cap PO BID [facility]


   Last Admin: 02/02/18 10:01 Dose:  1 cap


Lisinopril (Zestril)  40 mg PO DAILY [facility]


   Last Admin: 02/02/18 10:01 Dose:  40 mg


Metformin HCl (Glucophage)  1,000 mg PO BIDCC [facility]


   Last Admin: 02/02/18 08:33 Dose:  1,000 mg











- Labs


Labs: 


 





 01/31/18 08:33 





 01/31/18 08:33 





 











PT  13.4 SECONDS (9.7-12.2)  H  01/29/18  22:39    


 


INR  1.2   01/29/18  22:39    


 


APTT  22 SECONDS (21-34)   01/29/18  22:39

## 2018-02-02 NOTE — CP.PCM.PN
Subjective





- Date & Time of Evaluation


Date of Evaluation: 02/02/18


Time of Evaluation: 21:41





- Subjective


Subjective: 





CHIEF COMPLAINTS TODAY :


TEMP LOW-GRADE


c/o generalized itching WITH


generalized maculo-papular rash on trunk abdomen and arms and lower extremities 

noted ? zosyn ? 


? VANCO.








ROS.


HEENT :  N.


Resp :       No cough, wheezing ,pleuritic CP ,or hemoptysis 


Cardio :     No anginal  CP, PND, orthopnea, palpitation 


GI :           No abd.pain, n/v ,diarrhea or GI bleeding .


CNS : No headache, vertigo, focal deficit.


Musculoskel :  No joint swelling ,


Derm :      generalized maculo-papular rash on trunk abdomen and arms and lower 

extremities noted ? zosyn


  


Psych :     Normal affect.


Ext :  +VE SWELLING ,  POS L CALF PAIN.





PE.


Pt. is alert awake in no distress.


V.S  As noted in the chart 


Head ,ear nose,throat and eyes : Normal.


Neck : Supple with normal carotids.


Lungs: Clear air entry.


Heart : S1 & S2 normal with S4. No murmur.


Abd : Soft non tender with normal bowel sounds.


Neuro : Moves all ext. with no localized deficit.


Ext : KRISTY CH. LYMPHEDEMA WITH SWOLLEN  TENDER LEFT CALF  > RT.


Derm :  generalized maculo-papular rash on trunk abdomen and arms and lower 

extremities noted ? zosyn


  





LABS/RADIOLOGY: 


vANCO LEVEL LESS THAN 5. ? dONE ONLY ON THE SECOND DOSE.


DOPPLER NEG DVT 


wbc 10.9.


SUGARS 425


CREAT 1.1 / BUN 9





LFTS NORMAL


BLOOD CULTURES 1/29/18 -VE GROWTH SO FAR.





ASSESSMENT;


GENERALIZED RASH : ? ZOSYN


LEFT LOWER EXTREMITY CELLULITIS?STAPH/OR STREP


BILATERAL LOWER EXTREMITY LYMPHEDEMA..


NEW ONSET DIABETES MELLITUS.


MORBID OBESITY.


ALTAF





/PLAN : 


CANCEL VANCO LEVELS FOR TOMORROW


DC IV VANCOMYCIN.


sOLU-mEDROL 125 MG 1 DOSE.


start IV daptomycin 4 mg/kg IV piggyback once a day daily.2/2/18.


NOTIFY PHARMACY PATIENT IS ALLERGIC TO PENICILLIN, PATIENT ALSO HAD RASH ON 

REVIEWING THE PREVIOUS RECORDS TO IMIPENEM.


FOLLOW-UP CULTURES TO ADJUST ANTIBIOTICS.


BENADRYL 25 MG BY MOUTH WHEN NECESSARY EVERY 6 HOURLY FOR ITCHING.


ADEQUATE CONTROL OF SUGARS








Objective





- Vital Signs/Intake and Output


Vital Signs (last 24 hours): 


 











Temp Pulse Resp BP Pulse Ox


 


 99.6 F   94 H  20   128/71   100 


 


 02/02/18 16:00  02/02/18 16:00  02/02/18 16:00  02/02/18 16:00  02/02/18 16:00








Intake and Output: 


 











 02/02/18 02/03/18





 18:59 06:59


 


Intake Total 1340 


 


Balance 1340 














- Medications


Medications: 


 Current Medications





Acetaminophen (Tylenol 325mg Tab)  650 mg PO Q6 PRN


   PRN Reason: FEVER/  PAIN


   Last Admin: 01/31/18 10:04 Dose:  650 mg


Albuterol/Ipratropium (Duoneb 3 Mg/0.5 Mg (3 Ml) Ud)  3 ml INH RQ6 [facility]


   Last Admin: 02/02/18 19:42 Dose:  Not Given


Bumetanide (Bumex)  1 mg PO DAILY [facility]


   Last Admin: 02/02/18 10:01 Dose:  1 mg


Diphenhydramine HCl (Benadryl)  25 mg PO Q6 PRN


   PRN Reason: Itching / Pruritus


   Last Admin: 02/02/18 17:11 Dose:  25 mg


Enoxaparin Sodium (Lovenox)  30 mg SC Q12 [facility]


   Last Admin: 02/02/18 21:14 Dose:  30 mg


Glipizide (Glucotrol)  5 mg PO BID [facility]


   Last Admin: 02/02/18 17:43 Dose:  5 mg


Guaifenesin/Dextromethorphan (Robitussin Dm)  5 ml PO Q6 [facility]


   Last Admin: 02/02/18 17:43 Dose:  5 ml


Hydrocortisone (Cortizone 1% Cream)  0 gm TOP BID [facility]


   Last Admin: 02/02/18 17:50 Dose:  1 applic


Insulin Human Regular (Novolin R)  0 unit SC ACHS [facility]


   PRN Reason: Protocol


   Last Admin: 02/02/18 21:32 Dose:  Not Given


Lactobacillus Acidophilus (Bacid Acidophilus)  1 cap PO BID [facility]


   Last Admin: 02/02/18 17:43 Dose:  1 cap


Lisinopril (Zestril)  40 mg PO DAILY [facility]


   Last Admin: 02/02/18 10:01 Dose:  40 mg


Metformin HCl (Glucophage)  1,000 mg PO BIDCC [facility]


   Last Admin: 02/02/18 17:11 Dose:  1,000 mg











- Labs


Labs: 


 





 01/31/18 08:33 





 01/31/18 08:33 





 











PT  13.4 SECONDS (9.7-12.2)  H  01/29/18  22:39    


 


INR  1.2   01/29/18  22:39    


 


APTT  22 SECONDS (21-34)   01/29/18  22:39

## 2018-02-03 VITALS — RESPIRATION RATE: 20 BRPM

## 2018-02-03 RX ADMIN — HYDROCORTISONE SCH APPLIC: 10 CREAM TOPICAL at 10:42

## 2018-02-03 RX ADMIN — GUAIFENESIN AND DEXTROMETHORPHAN SCH ML: 100; 10 SYRUP ORAL at 05:19

## 2018-02-03 RX ADMIN — HUMAN INSULIN SCH UNIT: 100 INJECTION, SOLUTION SUBCUTANEOUS at 17:39

## 2018-02-03 RX ADMIN — HUMAN INSULIN SCH UNIT: 100 INJECTION, SOLUTION SUBCUTANEOUS at 21:41

## 2018-02-03 RX ADMIN — Medication SCH CAP: at 10:40

## 2018-02-03 RX ADMIN — HUMAN INSULIN SCH UNIT: 100 INJECTION, SOLUTION SUBCUTANEOUS at 12:24

## 2018-02-03 RX ADMIN — ENOXAPARIN SODIUM SCH MG: 30 INJECTION SUBCUTANEOUS at 10:40

## 2018-02-03 RX ADMIN — IPRATROPIUM BROMIDE AND ALBUTEROL SULFATE SCH: .5; 3 SOLUTION RESPIRATORY (INHALATION) at 07:52

## 2018-02-03 RX ADMIN — GUAIFENESIN AND DEXTROMETHORPHAN SCH ML: 100; 10 SYRUP ORAL at 00:44

## 2018-02-03 RX ADMIN — IPRATROPIUM BROMIDE AND ALBUTEROL SULFATE SCH: .5; 3 SOLUTION RESPIRATORY (INHALATION) at 01:20

## 2018-02-03 RX ADMIN — INSULIN GLARGINE SCH UNIT: 100 INJECTION, SOLUTION SUBCUTANEOUS at 21:40

## 2018-02-03 RX ADMIN — ENOXAPARIN SODIUM SCH MG: 30 INJECTION SUBCUTANEOUS at 21:14

## 2018-02-03 RX ADMIN — GUAIFENESIN AND DEXTROMETHORPHAN SCH ML: 100; 10 SYRUP ORAL at 17:35

## 2018-02-03 RX ADMIN — HUMAN INSULIN SCH UNIT: 100 INJECTION, SOLUTION SUBCUTANEOUS at 08:25

## 2018-02-03 RX ADMIN — Medication SCH CAP: at 17:35

## 2018-02-03 RX ADMIN — HYDROCORTISONE SCH APPLIC: 10 CREAM TOPICAL at 19:00

## 2018-02-03 RX ADMIN — IPRATROPIUM BROMIDE AND ALBUTEROL SULFATE SCH: .5; 3 SOLUTION RESPIRATORY (INHALATION) at 19:41

## 2018-02-03 RX ADMIN — IPRATROPIUM BROMIDE AND ALBUTEROL SULFATE SCH: .5; 3 SOLUTION RESPIRATORY (INHALATION) at 13:28

## 2018-02-03 RX ADMIN — GUAIFENESIN AND DEXTROMETHORPHAN SCH ML: 100; 10 SYRUP ORAL at 12:23

## 2018-02-03 NOTE — CP.PCM.PN
Subjective





- Date & Time of Evaluation


Date of Evaluation: 02/03/18


Time of Evaluation: 20:48





- Subjective


Subjective: 





CHIEF COMPLAINTS TODAY :


TEMP down


LESS itching !


generalized maculo-papular rash on trunk abdomen and arms and lower extremities 

noted ? zosyn 


PT TOLERATING IV DAPTOMYCIN


ROS.


HEENT :  N.


Resp :       No cough, wheezing ,pleuritic CP ,or hemoptysis 


Cardio :     No anginal  CP, PND, orthopnea, palpitation 


GI :           No abd.pain, n/v ,diarrhea or GI bleeding .


CNS : No headache, vertigo, focal deficit.


Musculoskel :  No joint swelling ,


Derm :      generalized maculo-papular rash on trunk abdomen and arms and lower 

extremities ? zosyn


  


Psych :     Normal affect.


Ext :  +VE SWELLING ,  POS L CALF PAIN.





PE.


Pt. is alert awake in no distress.


V.S  As noted in the chart 


Head ,ear nose,throat and eyes : Normal.


Neck : Supple with normal carotids.


Lungs: Clear air entry.


Heart : S1 & S2 normal with S4. No murmur.


Abd : Soft non tender with normal bowel sounds.


Neuro : Moves all ext. with no localized deficit.


Ext : KRISTY CH. LYMPHEDEMA WITH SWOLLEN  TENDER LEFT CALF  > RT.


Derm :  generalized maculo-papular rash on trunk abdomen and arms and lower 

extremities most probably 2 to ? zosyn


  





LABS/RADIOLOGY: 





DOPPLER NEG DVT 


wbc 10.9.


SUGARS 425


CREAT 1.1 / BUN 9





LFTS NORMAL


BLOOD CULTURES 1/29/18 -VE GROWTH SO FAR.





ASSESSMENT;


GENERALIZED RASH : ? ZOSYN


LEFT LOWER EXTREMITY CELLULITIS?STAPH/OR STREP


BILATERAL LOWER EXTREMITY LYMPHEDEMA..


NEW ONSET DIABETES MELLITUS.


MORBID OBESITY.


ALTAF





/PLAN : 


OFF OV ZOSYN


OFF  IV VANCOMYCIN.


SOLU-mEDROL 125 MG 1 DOSE.2/2/18


CONTINUE IV daptomycin 4 mg/kg IV piggyback once a day daily.2/2/18.


NOTIFY PHARMACY PATIENT IS ALLERGIC TO PENICILLIN, PATIENT ALSO HAD RASH ON 

REVIEWING THE PREVIOUS RECORDS TO IMIPENEM.


FOLLOW-UP CULTURES TO ADJUST ANTIBIOTICS.


BENADRYL 25 MG BY MOUTH WHEN NECESSARY EVERY 6 HOURLY FOR ITCHING.


ADEQUATE CONTROL OF SUGARS








Objective





- Vital Signs/Intake and Output


Vital Signs (last 24 hours): 


 











Temp Pulse Resp BP Pulse Ox


 


 98.2 F   94 H  20   153/66 H  95 


 


 02/03/18 16:00  02/03/18 16:00  02/03/18 16:00  02/03/18 16:00  02/03/18 16:00








Intake and Output: 


 











 02/03/18 02/04/18





 18:59 06:59


 


Intake Total 500 


 


Balance 500 














- Medications


Medications: 


 Current Medications





Acetaminophen (Tylenol 325mg Tab)  650 mg PO Q6 PRN


   PRN Reason: FEVER/  PAIN


   Last Admin: 01/31/18 10:04 Dose:  650 mg


Albuterol/Ipratropium (Duoneb 3 Mg/0.5 Mg (3 Ml) Ud)  3 ml INH RQ6 [facility]


   Last Admin: 02/03/18 19:41 Dose:  Not Given


Bumetanide (Bumex)  1 mg PO DAILY [facility]


   Last Admin: 02/03/18 10:40 Dose:  1 mg


Diphenhydramine HCl (Benadryl)  25 mg PO Q6 [facility]N


   PRN Reason: Itching / Pruritus


   Last Admin: 02/03/18 06:00 Dose:  25 mg


Enoxaparin Sodium (Lovenox)  30 mg SC Q12 [facility]


   Last Admin: 02/03/18 10:40 Dose:  30 mg


Glipizide (Glucotrol)  10 mg PO BID [facility]


   Last Admin: 02/03/18 17:35 Dose:  10 mg


Guaifenesin/Dextromethorphan (Robitussin Dm)  5 ml PO Q6 [facility]


   Last Admin: 02/03/18 17:35 Dose:  5 ml


Hydrocortisone (Cortizone 1% Cream)  0 gm TOP BID [facility]


   Last Admin: 02/03/18 10:42 Dose:  1 applic


Daptomycin 690 mg/ Sodium (Chloride)  100 mls @ 100 mls/hr IV Q24H [facility]


   Stop: 02/07/18 22:31


   Last Admin: 02/02/18 22:39 Dose:  100 mls/hr


Insulin Glargine (Lantus)  20 unit SC HS [facility]


Insulin Human Regular (Novolin R)  0 unit SC ACHS [facility]


   PRN Reason: Protocol


   Last Admin: 02/03/18 17:39 Dose:  6 unit


Lactobacillus Acidophilus (Bacid Acidophilus)  1 cap PO BID [facility]


   Last Admin: 02/03/18 17:35 Dose:  1 cap


Lisinopril (Zestril)  40 mg PO DAILY [facility]


   Last Admin: 02/03/18 10:40 Dose:  40 mg


Metformin HCl (Glucophage)  1,000 mg PO BIDCC [facility]


   Last Admin: 02/03/18 17:35 Dose:  1,000 mg











- Labs


Labs: 


 





 01/31/18 08:33 





 01/31/18 08:33 





 











PT  13.4 SECONDS (9.7-12.2)  H  01/29/18  22:39    


 


INR  1.2   01/29/18  22:39    


 


APTT  22 SECONDS (21-34)   01/29/18  22:39

## 2018-02-03 NOTE — CP.PCM.PN
Subjective





- Date & Time of Evaluation


Date of Evaluation: 02/03/18


Time of Evaluation: 14:31





- Subjective


Subjective: 





CHIEF COMPLAINTS TODAY :


LEFT CALF PAIN 


LOW GRADE TEMP 


RASH DOWN 





ROS.


HEENT :  N.


Resp :       No cough, wheezing ,pleuritic CP ,or hemoptysis 


Cardio :     No anginal  CP, PND, orthopnea, palpitation 


GI :           No abd.pain, n/v ,diarrhea or GI bleeding .


CNS : No headache, vertigo, focal deficit.


Musculoskel :  No joint swelling ,


Derm :        No rash 


Psych :     Normal affect.


Ext :  No  swelling ,  POS L calf pain 





PE.


Pt. is alert awake in no distress.


V.S  As noted in the chart 


Head ,ear nose,throat and eyes : Normal.


Neck : Supple with normal carotids.


Lungs: Clear air entry.


Heart : S1 & S2 normal with S4. No murmur.


Abd : Soft non tender with normal bowel sounds.


Neuro : Moves all ext. with no localized deficit.


Ext : KRISTY CH. LYMPHEDEMA WITH SWOLLEN  TENDER LEFT CALF 


Derm : No rashes or decubitus ulcer.





LABS/RADIOLOGY: DOPPLER NEG DVT 


SUGARS ARE UP 





ASSESSMENT/PLAN : 


ADD METFORMIN + GLIPIZIDE INCREASE DOSE 


IV AB 





Objective





- Vital Signs/Intake and Output


Vital Signs (last 24 hours): 


 











Temp Pulse Resp BP Pulse Ox


 


 98.7 F   92 H  20   133/77   97 


 


 02/03/18 08:38  02/03/18 08:38  02/03/18 08:38  02/03/18 08:38  02/03/18 08:38








Intake and Output: 


 











 02/03/18 02/03/18





 11:59 23:59


 


Intake Total 500 


 


Balance 500 














- Medications


Medications: 


 Current Medications





Acetaminophen (Tylenol 325mg Tab)  650 mg PO Q6 PRN


   PRN Reason: FEVER/  PAIN


   Last Admin: 01/31/18 10:04 Dose:  650 mg


Albuterol/Ipratropium (Duoneb 3 Mg/0.5 Mg (3 Ml) Ud)  3 ml INH RQ6 TOR


   Last Admin: 02/03/18 13:28 Dose:  Not Given


Bumetanide (Bumex)  1 mg PO DAILY [facility]


   Last Admin: 02/03/18 10:40 Dose:  1 mg


Diphenhydramine HCl (Benadryl)  25 mg PO Q6 PRN


   PRN Reason: Itching / Pruritus


   Last Admin: 02/03/18 06:00 Dose:  25 mg


Enoxaparin Sodium (Lovenox)  30 mg SC Q12 [facility]


   Last Admin: 02/03/18 10:40 Dose:  30 mg


Glipizide (Glucotrol)  10 mg PO BID [facility]


Guaifenesin/Dextromethorphan (Robitussin Dm)  5 ml PO Q6 [facility]


   Last Admin: 02/03/18 12:23 Dose:  5 ml


Hydrocortisone (Cortizone 1% Cream)  0 gm TOP BID [facility]


   Last Admin: 02/03/18 10:42 Dose:  1 applic


Daptomycin 690 mg/ Sodium (Chloride)  100 mls @ 100 mls/hr IV Q24H [facility]


   Stop: 02/07/18 22:31


   Last Admin: 02/02/18 22:39 Dose:  100 mls/hr


Insulin Glargine (Lantus)  20 unit SC HS [facility]


Insulin Human Regular (Novolin R)  0 unit SC ACHS [facility]


   PRN Reason: Protocol


   Last Admin: 02/03/18 12:24 Dose:  10 unit


Lactobacillus Acidophilus (Bacid Acidophilus)  1 cap PO BID [facility]


   Last Admin: 02/03/18 10:40 Dose:  1 cap


Lisinopril (Zestril)  40 mg PO DAILY [facility]


   Last Admin: 02/03/18 10:40 Dose:  40 mg


[facility]  [facility]


   Last Admin: 02/03/18 08:35 Dose:  1,000 mg











- Labs


Labs: 


 





 01/31/18 08:33 





 01/31/18 08:33 





 











PT  13.4 SECONDS (9.7-12.2)  H  01/29/18  22:39    


 


INR  1.2   01/29/18  22:39    


 


APTT  22 SECONDS (21-34)   01/29/18  22:39

## 2018-02-04 RX ADMIN — HYDROCORTISONE SCH APPLIC: 10 CREAM TOPICAL at 17:11

## 2018-02-04 RX ADMIN — GUAIFENESIN AND DEXTROMETHORPHAN SCH ML: 100; 10 SYRUP ORAL at 05:58

## 2018-02-04 RX ADMIN — GUAIFENESIN AND DEXTROMETHORPHAN SCH ML: 100; 10 SYRUP ORAL at 17:11

## 2018-02-04 RX ADMIN — GUAIFENESIN AND DEXTROMETHORPHAN SCH ML: 100; 10 SYRUP ORAL at 00:20

## 2018-02-04 RX ADMIN — GUAIFENESIN AND DEXTROMETHORPHAN SCH ML: 100; 10 SYRUP ORAL at 12:16

## 2018-02-04 RX ADMIN — HUMAN INSULIN SCH: 100 INJECTION, SOLUTION SUBCUTANEOUS at 22:18

## 2018-02-04 RX ADMIN — IPRATROPIUM BROMIDE AND ALBUTEROL SULFATE SCH: .5; 3 SOLUTION RESPIRATORY (INHALATION) at 13:43

## 2018-02-04 RX ADMIN — IPRATROPIUM BROMIDE AND ALBUTEROL SULFATE SCH ML: .5; 3 SOLUTION RESPIRATORY (INHALATION) at 07:43

## 2018-02-04 RX ADMIN — HYDROCORTISONE SCH APPLIC: 10 CREAM TOPICAL at 09:41

## 2018-02-04 RX ADMIN — HUMAN INSULIN SCH UNIT: 100 INJECTION, SOLUTION SUBCUTANEOUS at 12:16

## 2018-02-04 RX ADMIN — INSULIN GLARGINE SCH UNIT: 100 INJECTION, SOLUTION SUBCUTANEOUS at 22:35

## 2018-02-04 RX ADMIN — HUMAN INSULIN SCH UNIT: 100 INJECTION, SOLUTION SUBCUTANEOUS at 17:11

## 2018-02-04 RX ADMIN — Medication SCH CAP: at 17:11

## 2018-02-04 RX ADMIN — ENOXAPARIN SODIUM SCH MG: 30 INJECTION SUBCUTANEOUS at 09:41

## 2018-02-04 RX ADMIN — HUMAN INSULIN SCH UNIT: 100 INJECTION, SOLUTION SUBCUTANEOUS at 08:21

## 2018-02-04 RX ADMIN — IPRATROPIUM BROMIDE AND ALBUTEROL SULFATE SCH: .5; 3 SOLUTION RESPIRATORY (INHALATION) at 01:08

## 2018-02-04 RX ADMIN — ENOXAPARIN SODIUM SCH MG: 30 INJECTION SUBCUTANEOUS at 22:35

## 2018-02-04 RX ADMIN — Medication SCH CAP: at 09:42

## 2018-02-04 NOTE — CP.PCM.PN
Subjective





- Date & Time of Evaluation


Date of Evaluation: 02/04/18


Time of Evaluation: 15:23





- Subjective


Subjective: 





CHIEF COMPLAINTS TODAY :


LEFT CALF PAIN 


SUGARS UP


RASH DOWN 





ROS.


HEENT :  N.


Resp :       No cough, wheezing ,pleuritic CP ,or hemoptysis 


Cardio :     No anginal  CP, PND, orthopnea, palpitation 


GI :           No abd.pain, n/v ,diarrhea or GI bleeding .


CNS : No headache, vertigo, focal deficit.


Musculoskel :  No joint swelling ,


Derm :        No rash 


Psych :     Normal affect.


Ext :  No  swelling ,  POS L calf pain 





PE.


Pt. is alert awake in no distress.


V.S  As noted in the chart 


Head ,ear nose,throat and eyes : Normal.


Neck : Supple with normal carotids.


Lungs: Clear air entry.


Heart : S1 & S2 normal with S4. No murmur.


Abd : Soft non tender with normal bowel sounds.


Neuro : Moves all ext. with no localized deficit.


Ext : KRISTY CH. LYMPHEDEMA WITH SWOLLEN  TENDER LEFT CALF 


Derm : No rashes or decubitus ulcer.





LABS/RADIOLOGY: DOPPLER NEG DVT 


SUGARS ARE UP 





ASSESSMENT/PLAN : 


ADD METFORMIN + GLIPIZIDE INCREASE DOSE 


IV AB 





Objective





- Vital Signs/Intake and Output


Vital Signs (last 24 hours): 


 











Temp Pulse Resp BP Pulse Ox


 


 98.1 F   92 H  20   159/75 H  97 


 


 02/04/18 07:54  02/04/18 07:54  02/04/18 07:54  02/04/18 07:54  02/04/18 07:54








Intake and Output: 


 











 02/04/18 02/04/18





 11:59 23:59


 


Intake Total 240 500


 


Balance 240 500














- Medications


Medications: 


 Current Medications





Acetaminophen (Tylenol 325mg Tab)  650 mg PO Q6 PRN


   PRN Reason: FEVER/  PAIN


   Last Admin: 01/31/18 10:04 Dose:  650 mg


Bumetanide (Bumex)  1 mg PO DAILY [facility]


   Last Admin: 02/04/18 09:42 Dose:  1 mg


Diphenhydramine HCl (Benadryl)  25 mg PO Q6 PRN


   PRN Reason: Itching / Pruritus


   Last Admin: 02/04/18 09:47 Dose:  25 mg


Enoxaparin Sodium (Lovenox)  30 mg SC Q12 [facility]


   Last Admin: 02/04/18 09:41 Dose:  30 mg


Glipizide (Glucotrol)  10 mg PO BID [facility]


   Last Admin: 02/04/18 09:42 Dose:  10 mg


Guaifenesin/Dextromethorphan (Robitussin Dm)  5 ml PO Q6 [facility]


   Last Admin: 02/04/18 12:16 Dose:  5 ml


Hydrocortisone (Cortizone 1% Cream)  0 gm TOP BID [facility]


   Last Admin: 02/04/18 09:41 Dose:  1 applic


Daptomycin 690 mg/ Sodium (Chloride)  100 mls @ 100 mls/hr IV Q24H [facility]


   Stop: 02/07/18 22:31


   Last Admin: 02/03/18 21:38 Dose:  100 mls/hr


Insulin Glargine (Lantus)  20 unit SC HS [facility]


   Last Admin: 02/03/18 21:40 Dose:  20 unit


Insulin Human Regular (Novolin R)  0 unit SC ACHS [facility]


   PRN Reason: Protocol


   Last Admin: 02/04/18 12:16 Dose:  6 unit


Lactobacillus Acidophilus (Bacid Acidophilus)  1 cap PO BID [facility]


   Last Admin: 02/04/18 09:42 Dose:  1 cap


Lisinopril (Zestril)  40 mg PO DAILY [facility]


   Last Admin: 02/04/18 09:42 Dose:  40 mg


Metformin HCl (Glucophage)  1,000 mg PO BIDCC [facility]


   Last Admin: 02/04/18 08:21 Dose:  1,000 mg











- Labs


Labs: 


 





 01/31/18 08:33 





 01/31/18 08:33 





 











PT  13.4 SECONDS (9.7-12.2)  H  01/29/18  22:39    


 


INR  1.2   01/29/18  22:39    


 


APTT  22 SECONDS (21-34)   01/29/18  22:39

## 2018-02-05 VITALS — OXYGEN SATURATION: 96 %

## 2018-02-05 RX ADMIN — GUAIFENESIN AND DEXTROMETHORPHAN SCH ML: 100; 10 SYRUP ORAL at 18:16

## 2018-02-05 RX ADMIN — GUAIFENESIN AND DEXTROMETHORPHAN SCH ML: 100; 10 SYRUP ORAL at 05:10

## 2018-02-05 RX ADMIN — HUMAN INSULIN SCH: 100 INJECTION, SOLUTION SUBCUTANEOUS at 21:29

## 2018-02-05 RX ADMIN — GUAIFENESIN AND DEXTROMETHORPHAN SCH ML: 100; 10 SYRUP ORAL at 12:41

## 2018-02-05 RX ADMIN — ENOXAPARIN SODIUM SCH MG: 30 INJECTION SUBCUTANEOUS at 09:28

## 2018-02-05 RX ADMIN — GUAIFENESIN AND DEXTROMETHORPHAN SCH ML: 100; 10 SYRUP ORAL at 00:30

## 2018-02-05 RX ADMIN — Medication SCH CAP: at 18:16

## 2018-02-05 RX ADMIN — HUMAN INSULIN SCH UNIT: 100 INJECTION, SOLUTION SUBCUTANEOUS at 12:41

## 2018-02-05 RX ADMIN — HUMAN INSULIN SCH UNIT: 100 INJECTION, SOLUTION SUBCUTANEOUS at 16:55

## 2018-02-05 RX ADMIN — Medication SCH CAP: at 09:27

## 2018-02-05 RX ADMIN — HYDROCORTISONE SCH APPLIC: 10 CREAM TOPICAL at 09:28

## 2018-02-05 RX ADMIN — HYDROCORTISONE SCH APPLIC: 10 CREAM TOPICAL at 18:17

## 2018-02-05 RX ADMIN — HUMAN INSULIN SCH UNIT: 100 INJECTION, SOLUTION SUBCUTANEOUS at 08:01

## 2018-02-05 RX ADMIN — ENOXAPARIN SODIUM SCH MG: 30 INJECTION SUBCUTANEOUS at 21:32

## 2018-02-05 NOTE — CP.PCM.PN
Subjective





- Date & Time of Evaluation


Date of Evaluation: 02/05/18


Time of Evaluation: 14:05





- Subjective


Subjective: 





CHIEF COMPLAINTS TODAY :


afebrile


LESS itching !


generalized maculo-papular rash on trunk abdomen and arms much improved


PT TOLERATING IV DAPTOMYCIN


ROS.


HEENT :  N.


Resp :       No cough, wheezing ,pleuritic CP ,or hemoptysis 


Cardio :     No anginal  CP, PND, orthopnea, palpitation 


GI :           No abd.pain, n/v ,diarrhea or GI bleeding .


CNS : No headache, vertigo, focal deficit.


Musculoskel :  No joint swelling ,


Derm :      generalized maculo-papular rash on trunk abdomen and arms and lower 

extremities improving


  


Psych :     Normal affect.


Ext :  +VE SWELLING ,  POS L CALF PAIN.





PE.


Pt. is alert awake in no distress.


V.S  As noted in the chart 


Head ,ear nose,throat and eyes : Normal.


Neck : Supple with normal carotids.


Lungs: Clear air entry.


Heart : S1 & S2 normal with S4. No murmur.


Abd : Soft non tender with normal bowel sounds.


Neuro : Moves all ext. with no localized deficit.


Ext : KRISTY CH. LYMPHEDEMA WITH SWOLLEN  TENDER LEFT CALF  > RT.


Derm :  generalized maculo-papular rash on trunk abdomen and arms and lower 

extremities improving


  





LABS/RADIOLOGY: 





DOPPLER NEG DVT 


wbc 10.9.


SUGARS 425


CREAT 1.1 / BUN 9





LFTS NORMAL


BLOOD CULTURES 1/29/18 -VE GROWTH SO FAR.





ASSESSMENT;


GENERALIZED RASH : ? ZOSYN


LEFT LOWER EXTREMITY CELLULITIS?STAPH/OR STREP


BILATERAL LOWER EXTREMITY LYMPHEDEMA..


NEW ONSET DIABETES MELLITUS.


MORBID OBESITY.


ALTAF





/PLAN : 


PATIENT WILL NEED A PICC LINE


CONTINUE IV daptomycin 4 mg/kg IV piggyback once a day daily.2/2/18. 


FOR 7 DAYS MORE





BENADRYL 25 MG BY MOUTH WHEN NECESSARY EVERY 6 HOURLY FOR ITCHING.


ADEQUATE CONTROL OF SUGARS





CASE DISCUSSED WITH ATTENDING/AND NP AND .








Objective





- Vital Signs/Intake and Output


Vital Signs (last 24 hours): 


 











Temp Pulse Resp BP Pulse Ox


 


 98.6 F   91 H  20   154/85 H  98 


 


 02/05/18 07:53  02/05/18 07:53  02/05/18 07:53  02/05/18 07:53  02/05/18 07:53








Intake and Output: 


 











 02/05/18 02/05/18





 06:59 18:59


 


Intake Total 1000 


 


Balance 1000 














- Medications


Medications: 


 Current Medications





Acetaminophen (Tylenol 325mg Tab)  650 mg PO Q6 PRN


   PRN Reason: FEVER/  PAIN


   Last Admin: 01/31/18 10:04 Dose:  650 mg


Bumetanide (Bumex)  1 mg PO DAILY [facility]


   Last Admin: 02/05/18 09:27 Dose:  1 mg


Diphenhydramine HCl (Benadryl)  25 mg PO Q6 PRN


   PRN Reason: Itching / Pruritus


   Last Admin: 02/05/18 05:10 Dose:  25 mg


Enoxaparin Sodium (Lovenox)  30 mg SC Q12 [facility]


   Last Admin: 02/05/18 09:28 Dose:  30 mg


Glipizide (Glucotrol)  10 mg PO BID [facility]


   Last Admin: 02/05/18 09:27 Dose:  10 mg


Guaifenesin/Dextromethorphan (Robitussin Dm)  5 ml PO Q6 [facility]


   Last Admin: 02/05/18 12:41 Dose:  5 ml


Hydrocortisone (Cortizone 1% Cream)  0 gm TOP BID [facility]


   Last Admin: 02/05/18 09:28 Dose:  1 applic


Daptomycin 690 mg/ Sodium (Chloride)  100 mls @ 100 mls/hr IV Q24H [facility]


   Stop: 02/07/18 22:31


   Last Admin: 02/04/18 22:36 Dose:  100 mls/hr


Insulin Glargine (Lantus)  30 unit SC HS [facility]


Insulin Human Regular (Novolin R)  0 unit SC ACHS [facility]


   PRN Reason: Protocol


   Last Admin: 02/05/18 12:41 Dose:  3 unit


Lactobacillus Acidophilus (Bacid Acidophilus)  1 cap PO BID [facility]


   Last Admin: 02/05/18 09:27 Dose:  1 cap


Lisinopril (Zestril)  40 mg PO DAILY [facility]


   Last Admin: 02/05/18 09:27 Dose:  40 mg


[facility]


   Last Admin: 02/05/18 08:01 Dose:  1,000 mg











- Labs


Labs: 


 





 01/31/18 08:33 





 01/31/18 08:33 





 











PT  13.4 SECONDS (9.7-12.2)  H  01/29/18  22:39    


 


INR  1.2   01/29/18  22:39    


 


APTT  22 SECONDS (21-34)   01/29/18  22:39

## 2018-02-05 NOTE — CP.PCM.PN
Subjective





- Date & Time of Evaluation


Date of Evaluation: 02/05/18


Time of Evaluation: 13:20





- Subjective


Subjective: 





CHIEF COMPLAINTS TODAY :


LEFT CALF PAIN 


SUGARS UP


RASH DOWN 





ROS.


HEENT :  N.


Resp :       No cough, wheezing ,pleuritic CP ,or hemoptysis 


Cardio :     No anginal  CP, PND, orthopnea, palpitation 


GI :           No abd.pain, n/v ,diarrhea or GI bleeding .


CNS : No headache, vertigo, focal deficit.


Musculoskel :  No joint swelling ,


Derm :        No rash 


Psych :     Normal affect.


Ext :  No  swelling ,  POS L calf pain 





PE.


Pt. is alert awake in no distress.


V.S  As noted in the chart 


Head ,ear nose,throat and eyes : Normal.


Neck : Supple with normal carotids.


Lungs: Clear air entry.


Heart : S1 & S2 normal with S4. No murmur.


Abd : Soft non tender with normal bowel sounds.


Neuro : Moves all ext. with no localized deficit.


Ext : KRISTY CH. LYMPHEDEMA WITH SWOLLEN  TENDER LEFT CALF 


Derm : No rashes or decubitus ulcer.





LABS/RADIOLOGY: DOPPLER NEG DVT 


SUGARS ARE UP 





ASSESSMENT/PLAN : 


ADD METFORMIN + GLIPIZIDE INCREASE DOSE 


IV AB 


PICC LINE








Objective





- Vital Signs/Intake and Output


Vital Signs (last 24 hours): 


 











Temp Pulse Resp BP Pulse Ox


 


 98.6 F   91 H  20   154/85 H  98 


 


 02/05/18 07:53  02/05/18 07:53  02/05/18 07:53  02/05/18 07:53  02/05/18 07:53








Intake and Output: 


 











 02/05/18 02/05/18





 11:59 23:59


 


Intake Total 400 


 


Balance 400 














- Medications


Medications: 


 Current Medications





Acetaminophen (Tylenol 325mg Tab)  650 mg PO Q6 PRN


   PRN Reason: FEVER/  PAIN


   Last Admin: 01/31/18 10:04 Dose:  650 mg


Bumetanide (Bumex)  1 mg PO DAILY [facility]


   Last Admin: 02/05/18 09:27 Dose:  1 mg


Diphenhydramine HCl (Benadryl)  25 mg PO Q6 PRN


   PRN Reason: Itching / Pruritus


   Last Admin: 02/05/18 05:10 Dose:  25 mg


Enoxaparin Sodium (Lovenox)  30 mg SC Q12 [facility]


   Last Admin: 02/05/18 09:28 Dose:  30 mg


Glipizide (Glucotrol)  10 mg PO BID [facility]


   Last Admin: 02/05/18 09:27 Dose:  10 mg


Guaifenesin/Dextromethorphan (Robitussin Dm)  5 ml PO Q6 [facility]


   Last Admin: 02/05/18 12:41 Dose:  5 ml


Hydrocortisone (Cortizone 1% Cream)  0 gm TOP BID [facility]


   Last Admin: 02/05/18 09:28 Dose:  1 applic


Daptomycin 690 mg/ Sodium (Chloride)  100 mls @ 100 mls/hr IV Q24H [facility]


   Stop: 02/07/18 22:31


   Last Admin: 02/04/18 22:36 Dose:  100 mls/hr


Insulin Glargine (Lantus)  20 unit SC HS [facility]


   Last Admin: 02/04/18 22:35 Dose:  20 unit


Insulin Human Regular (Novolin R)  0 unit SC ACHS [facility]


   PRN Reason: Protocol


   Last Admin: 02/05/18 12:41 Dose:  3 unit


Lactobacillus Acidophilus (Bacid Acidophilus)  1 cap PO BID [facility]


   Last Admin: 02/05/18 09:27 Dose:  1 cap


Lisinopril (Zestril)  40 mg PO DAILY [facility]


   Last Admin: 02/05/18 09:27 Dose:  40 mg


Metformin HCl (Glucophage)  1,000 mg PO BIDCC [facility]


   Last Admin: 02/05/18 08:01 Dose:  1,000 mg











- Labs


Labs: 


 





 01/31/18 08:33 





 01/31/18 08:33 





 











PT  13.4 SECONDS (9.7-12.2)  H  01/29/18  22:39    


 


INR  1.2   01/29/18  22:39    


 


APTT  22 SECONDS (21-34)   01/29/18  22:39

## 2018-02-06 VITALS — DIASTOLIC BLOOD PRESSURE: 77 MMHG | HEART RATE: 87 BPM | SYSTOLIC BLOOD PRESSURE: 135 MMHG | TEMPERATURE: 98.1 F

## 2018-02-06 LAB
ALBUMIN SERPL-MCNC: 3.6 G/DL (ref 3.5–5)
ALBUMIN/GLOB SERPL: 0.8 {RATIO} (ref 1–2.1)
ALT SERPL-CCNC: 44 U/L (ref 21–72)
AST SERPL-CCNC: 26 U/L (ref 17–59)
BASOPHILS # BLD AUTO: 0 K/UL (ref 0–0.2)
BASOPHILS NFR BLD: 0.3 % (ref 0–2)
BUN SERPL-MCNC: 12 MG/DL (ref 9–20)
CALCIUM SERPL-MCNC: 9.2 MG/DL (ref 8.6–10.4)
EOSINOPHIL # BLD AUTO: 1.3 K/UL (ref 0–0.7)
EOSINOPHIL NFR BLD: 9.4 % (ref 0–4)
ERYTHROCYTE [DISTWIDTH] IN BLOOD BY AUTOMATED COUNT: 13.8 % (ref 11.5–14.5)
GFR NON-AFRICAN AMERICAN: > 60
HGB BLD-MCNC: 11.2 G/DL (ref 12–18)
LYMPHOCYTES # BLD AUTO: 2.1 K/UL (ref 1–4.3)
LYMPHOCYTES NFR BLD AUTO: 15 % (ref 20–40)
MCH RBC QN AUTO: 28.8 PG (ref 27–31)
MCHC RBC AUTO-ENTMCNC: 33.2 G/DL (ref 33–37)
MCV RBC AUTO: 86.8 FL (ref 80–94)
MONOCYTES # BLD: 0.7 K/UL (ref 0–0.8)
MONOCYTES NFR BLD: 5.4 % (ref 0–10)
NEUTROPHILS # BLD: 9.7 K/UL (ref 1.8–7)
NEUTROPHILS NFR BLD AUTO: 69.9 % (ref 50–75)
NRBC BLD AUTO-RTO: 0.2 % (ref 0–2)
PLATELET # BLD: 583 K/UL (ref 130–400)
PMV BLD AUTO: 9.1 FL (ref 7.2–11.7)
RBC # BLD AUTO: 3.88 MIL/UL (ref 4.4–5.9)
WBC # BLD AUTO: 13.9 K/UL (ref 4.8–10.8)

## 2018-02-06 PROCEDURE — 02HV33Z INSERTION OF INFUSION DEVICE INTO SUPERIOR VENA CAVA, PERCUTANEOUS APPROACH: ICD-10-PCS | Performed by: RADIOLOGY

## 2018-02-06 RX ADMIN — HYDROCORTISONE SCH APPLIC: 10 CREAM TOPICAL at 10:56

## 2018-02-06 RX ADMIN — GUAIFENESIN AND DEXTROMETHORPHAN SCH: 100; 10 SYRUP ORAL at 11:53

## 2018-02-06 RX ADMIN — Medication SCH CAP: at 10:55

## 2018-02-06 RX ADMIN — GUAIFENESIN AND DEXTROMETHORPHAN SCH ML: 100; 10 SYRUP ORAL at 00:05

## 2018-02-06 RX ADMIN — ENOXAPARIN SODIUM SCH MG: 30 INJECTION SUBCUTANEOUS at 11:00

## 2018-02-06 RX ADMIN — HUMAN INSULIN SCH UNIT: 100 INJECTION, SOLUTION SUBCUTANEOUS at 11:50

## 2018-02-06 RX ADMIN — GUAIFENESIN AND DEXTROMETHORPHAN SCH ML: 100; 10 SYRUP ORAL at 05:20

## 2018-02-06 RX ADMIN — HUMAN INSULIN SCH UNIT: 100 INJECTION, SOLUTION SUBCUTANEOUS at 08:30

## 2018-02-06 NOTE — PCM.[email or web address]
Surgeon's Initial Post Op Note





- Surgeon's Notes


Surgeon: Chris Raman MD


Assistant: NONE


Type of Anesthesia: Local


Pre-Operative Diagnosis: Poor venous access


Operative Findings: US showed patent right brachial vein.


Post-Operative Diagnosis: Poor venous access


Operation Performed: Single lumen picc, 39 cm, placement right brachial vein. 

Tip is in the SVC.


Specimen/Specimens Removed: none


Estimated Blood Loss: EBL {In ML}: 2


Blood Products Given: N/A


Drains Used: No Drains


Post-Op Condition: Fair


Date of Surgery/Procedure: 02/06/18


Time of Surgery/Procedure: 10:35

## 2018-02-06 NOTE — CP.PCM.DIS
Provider





- Provider


Date of Admission: 


01/29/18 23:07





Attending physician: 


Giovany Walker MD





Time Spent in preparation of Discharge (in minutes): 32





Hospital Course





- Lab Results


Lab Results: 


 Micro Results





02/02/18 01:00   Blood-Venous   Blood Culture - Preliminary


                            NO GROWTH AFTER 4 DAYS


02/02/18 00:30   Blood-Venous   Blood Culture - Preliminary


                            NO GROWTH AFTER 4 DAYS


01/29/18 21:00   Blood   Blood Culture - Final


                            NO GROWTH AFTER 5 DAYS


01/29/18 21:00   Blood   Gram Stain - Final


                            TEST NOT PERFORMED


01/29/18 21:30   Blood   Blood Culture - Final


                            NO GROWTH AFTER 5 DAYS


01/29/18 21:30   Blood   Gram Stain - Final


                            TEST NOT PERFORMED





 Most Recent Lab Values











WBC  10.9 K/uL (4.8-10.8)  H  01/31/18  08:33    


 


RBC  3.22 Mil/uL (4.40-5.90)  L  01/31/18  08:33    


 


Hgb  9.6 g/dL (12.0-18.0)  L  01/31/18  08:33    


 


Hct  27.6 % (35.0-51.0)  L  01/31/18  08:33    


 


MCV  85.8 fL (80.0-94.0)   01/31/18  08:33    


 


MCH  29.7 pg (27.0-31.0)   01/31/18  08:33    


 


MCHC  34.6 g/dL (33.0-37.0)   01/31/18  08:33    


 


RDW  13.4 % (11.5-14.5)   01/31/18  08:33    


 


Plt Count  279 K/uL (130-400)   01/31/18  08:33    


 


MPV  9.9 fL (7.2-11.7)   01/31/18  08:33    


 


Neut % (Auto)  79.2 % (50.0-75.0)  H  01/31/18  08:33    


 


Lymph % (Auto)  7.8 % (20.0-40.0)  L  01/31/18  08:33    


 


Mono % (Auto)  8.6 % (0.0-10.0)   01/31/18  08:33    


 


Eos % (Auto)  4.3 % (0.0-4.0)  H  01/31/18  08:33    


 


Baso % (Auto)  0.1 % (0.0-2.0)   01/31/18  08:33    


 


Neut # (Auto)  8.6 K/uL (1.8-7.0)  H  01/31/18  08:33    


 


Lymph # (Auto)  0.8 K/uL (1.0-4.3)  L  01/31/18  08:33    


 


Mono # (Auto)  0.9 K/uL (0.0-0.8)  H  01/31/18  08:33    


 


Eos # (Auto)  0.5 K/uL (0.0-0.7)   01/31/18  08:33    


 


Baso # (Auto)  0.0 K/uL (0.0-0.2)   01/31/18  08:33    


 


Neutrophils % (Manual)  72 % (50-75)   01/31/18  08:33    


 


Band Neutrophils %  6 % (0-2)  H  01/31/18  08:33    


 


Lymphocytes % (Manual)  3 % (20-40)  L  01/31/18  08:33    


 


Reactive Lymphs %  1 % (0-0)  H  01/31/18  08:33    


 


Monocytes % (Manual)  8 % (0-10)   01/31/18  08:33    


 


Eosinophils % (Manual)  8 % (0-4)  H  01/31/18  08:33    


 


Basophils % (Manual)  1 % (0-2)   01/31/18  08:33    


 


Metamyelocytes %  1 % (0-0)  H  01/31/18  08:33    


 


Large Platelets  Present   01/30/18  12:03    


 


Platelet Estimate  Normal  (NORMAL)   01/31/18  08:33    


 


Hypochromasia (manual)  Slight   01/30/18  12:03    


 


Poikilocytosis (manual  Slight   01/30/18  12:03    


 


Anisocytosis (manual)  Slight   01/30/18  12:03    


 


RBC Morphology  Normal   01/31/18  08:33    


 


Target Cells  Slight   01/30/18  12:03    


 


PT  13.4 SECONDS (9.7-12.2)  H  01/29/18  22:39    


 


INR  1.2   01/29/18  22:39    


 


APTT  22 SECONDS (21-34)   01/29/18  22:39    


 


D-Dimer, Quantitative  552 ng/mlDDU (0-243)  H  01/29/18  22:39    


 


Sodium  131 mmol/L (132-148)  L  01/31/18  08:33    


 


Potassium  3.5 mmol/L (3.6-5.2)  L  01/31/18  08:33    


 


Chloride  93 mmol/L ()  L  01/31/18  08:33    


 


Carbon Dioxide  30 mmol/L (22-30)   01/31/18  08:33    


 


Anion Gap  12  (10-20)   01/31/18  08:33    


 


BUN  9 mg/dL (9-20)   01/31/18  08:33    


 


Creatinine  1.1 mg/dL (0.8-1.5)   01/31/18  08:33    


 


Est GFR ( Amer)  > 60   01/31/18  08:33    


 


Est GFR (Non-Af Amer)  > 60   01/31/18  08:33    


 


POC Glucose (mg/dL)  255 mg/dL ()  H  02/06/18  10:51    


 


Random Glucose  357 mg/dL ()  H  01/31/18  08:33    


 


Hemoglobin A1c  10.4 % (4.2-6.5)  H  02/01/18  11:13    


 


Calcium  9.0 mg/dl (8.6-10.4)   01/31/18  08:33    


 


Total Bilirubin  0.9 mg/dL (0.2-1.3)   01/31/18  08:33    


 


AST  33 U/L (17-59)   01/31/18  08:33    


 


ALT  42 U/L (21-72)   01/31/18  08:33    


 


Alkaline Phosphatase  122 U/L ()   01/31/18  08:33    


 


Total Protein  7.4 g/dL (6.3-8.3)   01/31/18  08:33    


 


Albumin  3.6 g/dL (3.5-5.0)   01/31/18  08:33    


 


Globulin  3.9 gm/dL (2.2-3.9)   01/31/18  08:33    


 


Albumin/Globulin Ratio  0.9  (1.0-2.1)  L  01/31/18  08:33    


 


Urine Color  Yellow  (YELLOW)   01/29/18  22:05    


 


Urine Clarity  Clear  (Clear)   01/29/18  22:05    


 


Urine pH  6.0  (5.0-8.0)   01/29/18  22:05    


 


Ur Specific Gravity  1.024  (1.003-1.030)   01/29/18  22:05    


 


Urine Protein  2+ mg/dL (NEGATIVE)  H  01/29/18  22:05    


 


Urine Glucose (UA)  3+ mg/dL (Normal)  H  01/29/18  22:05    


 


Urine Ketones  Negative mg/dL (NEGATIVE)   01/29/18  22:05    


 


Urine Blood  1+  (NEGATIVE)  H  01/29/18  22:05    


 


Urine Nitrate  Negative  (NEGATIVE)   01/29/18  22:05    


 


Urine Bilirubin  Negative  (NEGATIVE)   01/29/18  22:05    


 


Urine Urobilinogen  Normal mg/dL (0.2-1.0)   01/29/18  22:05    


 


Ur Leukocyte Esterase  Neg Jeison/uL (Negative)   01/29/18  22:05    


 


Urine WBC (Auto)  3 /hpf (0-5)   01/29/18  22:05    


 


Urine RBC (Auto)  2 /hpf (0-3)   01/29/18  22:05    


 


Ur Squamous Epith Cells  < 1 /hpf (0-5)   01/29/18  22:05    


 


Vancomycin Trough  < 5.0 ug/mL (5.0-10.0)  L  02/01/18  00:20    


 


Serum Ketones  Negative  (NEGATIVE)   01/29/18  22:45    














- Hospital Course


Hospital Course: 





ADMITTED WITH CELLULITIS OF LEFT CALF WITH OUT PT FAILURE OF PO AB 


FEW DAYS AGO PT PRESENTED TO PMD WITH FLU LIKE SYMPTOMS AND LEFT CALF PAIN AND 

SWELLING . PT WAS GIVEN TAMIFLU . PRESENTED NEXT DAY TO  ER AND WAS GIVEN PO 

KEFLEX . 


ABOVE PO AB DID NOT IMPROVE PT'S SYMPTOMS AND SWELLING GOT WORSE A/W 

DIAPHORESIS , FEVER AND FATIGUED 


PT ALSO WAS FOUND TO HAVE NEW ONSET OF HIGH SUGAR 





PAST HIST.


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 





NEG FOR DVT 


BC NEG 


PT IMPROVED ON IV AB 


HAS ALLERGY TO CEPHALOSPORIN 


SUGARS WERE UP REQUIRING 2 MEDS AND INSULIN 


PICC LINE AND OUT PT IV AB DAPTO 





Discharge Plan





- Discharge Medications


Prescriptions: 


DiphenhydrAMINE [Benadryl] 25 mg PO Q6 PRN #10 cap


 PRN Reason: Itching / Pruritus


DAPTOmycin [Cubicin] 690 mg IV Q24H 7 Days  vial


metFORMIN [glucOPHAGE] 1,000 mg PO BIDCC #60 tab


GlipiZIDE [Glucotrol] 5 mg PO BID #60 tab





- Follow Up Plan


Condition: STABLE


Disposition: HOME/ ROUTINE


Instructions:  Diphenhydramine (By mouth), Metformin (By mouth), Daptomycin (By 

injection), How to Stop Smoking (DC), Cellulitis (DC), Heart Healthy Diet (DC), 

How to Check Your Blood Sugar (DC), Diabetes Mellitus Type 2 in Adults (DC), 

Hypertension (DC)


Additional Instructions: 


Discharge home as per Dr. Walker.


Cubicin IV daily as ordered / home infusion for 7 days via PICC line.


Follow up with Dr. Walker in 1 week


Metformin / glipizide / glucometer

## 2018-02-06 NOTE — CP.PCM.PN
Subjective





- Date & Time of Evaluation


Date of Evaluation: 02/06/18


Time of Evaluation: 13:22





Objective





- Vital Signs/Intake and Output


Vital Signs (last 24 hours): 


 











Temp Pulse Resp BP Pulse Ox


 


 98.1 F   87   20   135/77   96 


 


 02/06/18 07:36  02/06/18 07:36  02/06/18 07:36  02/06/18 07:36  02/06/18 07:36








Intake and Output: 


 











 02/06/18 02/06/18





 06:59 18:59


 


Intake Total 700 


 


Output Total 400 


 


Balance 300 














- Medications


Medications: 


 Current Medications





Acetaminophen (Tylenol 325mg Tab)  650 mg PO Q6 PRN


   PRN Reason: FEVER/  PAIN


   Last Admin: 01/31/18 10:04 Dose:  650 mg


Bumetanide (Bumex)  1 mg PO DAILY [facility]


   Last Admin: 02/06/18 10:55 Dose:  1 mg


Diphenhydramine HCl (Benadryl)  25 mg PO Q6 PRN


   PRN Reason: Itching / Pruritus


   Last Admin: 02/06/18 05:20 Dose:  25 mg


Enoxaparin Sodium (Lovenox)  30 mg SC Q12 [facility]


   Last Admin: 02/06/18 11:00 Dose:  30 mg


Glipizide (Glucotrol)  10 mg PO BID [facility]


   Last Admin: 02/06/18 11:00 Dose:  10 mg


Guaifenesin/Dextromethorphan (Robitussin Dm)  5 ml PO Q6 [facility]


   Last Admin: 02/06/18 11:53 Dose:  Not Given


Hydrocortisone (Cortizone 1% Cream)  0 gm TOP BID [facility]


   Last Admin: 02/06/18 10:56 Dose:  1 applic


Daptomycin 690 mg/ Sodium (Chloride)  100 mls @ 100 mls/hr IV Q24H [facility]


   Stop: 02/07/18 22:31


   Last Admin: 02/05/18 21:33 Dose:  100 mls/hr


Insulin Glargine (Lantus)  30 unit SC HS [facility]


   Last Admin: 02/05/18 21:32 Dose:  30 unit


Insulin Human Regular (Novolin R)  0 unit SC ACHS Count includes the [facility]


   PRN Reason: Protocol


   Last Admin: 02/06/18 11:50 Dose:  4 unit


Lactobacillus Acidophilus (Bacid Acidophilus)  1 cap PO BID Count includes the [facility]


   Last Admin: 02/06/18 10:55 Dose:  1 cap


Lisinopril (Zestril)  40 mg PO DAILY Count includes the [facility]


   Last Admin: 02/06/18 11:00 Dose:  40 mg


Metformin HCl (Glucophage)  1,000 mg PO BIDCC Count includes the [facility]


   Last Admin: 02/06/18 08:30 Dose:  1,000 mg











- Labs


Labs: 


 





 01/31/18 08:33 





 01/31/18 08:33 





 











PT  13.4 SECONDS (9.7-12.2)  H  01/29/18  22:39    


 


INR  1.2   01/29/18  22:39    


 


APTT  22 SECONDS (21-34)   01/29/18  22:39

## 2018-08-17 ENCOUNTER — HOSPITAL ENCOUNTER (EMERGENCY)
Dept: HOSPITAL 42 - ED | Age: 38
Discharge: HOME | End: 2018-08-17
Payer: COMMERCIAL

## 2018-08-17 VITALS — RESPIRATION RATE: 18 BRPM | DIASTOLIC BLOOD PRESSURE: 78 MMHG | SYSTOLIC BLOOD PRESSURE: 133 MMHG | HEART RATE: 80 BPM

## 2018-08-17 VITALS — TEMPERATURE: 98 F | OXYGEN SATURATION: 98 %

## 2018-08-17 DIAGNOSIS — S61.411A: Primary | ICD-10-CM

## 2018-08-17 DIAGNOSIS — Z87.891: ICD-10-CM

## 2018-08-17 DIAGNOSIS — I10: ICD-10-CM

## 2018-08-17 DIAGNOSIS — Z23: ICD-10-CM
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
## 2018-08-17 NOTE — ED PDOC
Arrival/HPI





- General


Historian: Patient





- General


Chief Complaint: Abnormal Skin Integrity


Time Seen by Provider: 08/17/18 16:03





- History of Present Illness


Narrative History of Present Illness (Text): 





08/17/18 17:04


Patient is a 38 year old male with no PMH who presents to ED with a laceration 

on his right hand. He was in [place] visiting an auto parts shop when he got 

upset when he found out how much a particular part would cost. He moved his 

hand across the metal counter, cutting his hand on the dorsal side. He 

currently has a 2.5 cm superficial laceration on the dorsum of his right hand.  

(Rd Snell)





Past Medical History





- Cardiac


Hx Cardiac Disorders: Yes


Hx Hypertension: Yes





- Pulmonary


Hx Respiratory Disorders: No





- Neurological


Hx Neurological Disorder: No





- HEENT


Hx HEENT Disorder: No





- Renal


Hx Renal Disorder: No





- Endocrine/Metabolic


Hx Endocrine Disorders: Yes (borderline DM)





- Hematological/Oncological


Hx Blood Disorders: Yes (lymphedema)





- Integumentary


Hx Dermatological Disorder: No





- Musculoskeletal/Rheumatological


Hx Musculoskeletal Disorders: No





- Gastrointestinal


Hx Gastrointestinal Disorders: No





- Genitourinary/Gynecological


Hx Genitourinary Disorders: No





- Psychiatric


Hx Psychophysiologic Disorder: No


Hx Substance Use: No





Family/Social History


Family/Social History: No Known Family HX


Smoking Status: Former Smoker


Hx Alcohol Use: Yes


Frequency of alcohol use: Socially


Hx Substance Use: No





Allergies/Home Meds


Allergies/Adverse Reactions: 


Allergies





Penicillins Allergy (Verified 08/17/18 16:37)


 ANAPHYLAXIS








Home Medications: 


 Home Meds











 Medication  Instructions  Recorded  Confirmed


 


Unobtainable  08/17/18 08/17/18














Review of Systems





- Physician Review


All systems were reviewed & negative as marked: Yes





- Review of Systems


Constitutional: absent: Fatigue, Weight Change


Eyes: absent: Vision Changes


ENT: absent: Sore Throat, Rhinorrhea


Respiratory: absent: SOB, Cough


Cardiovascular: absent: Chest Pain, Syncope


Gastrointestinal: absent: Abdominal Pain, Nausea, Vomiting


Genitourinary Male: absent: Dysuria, Frequency


Musculoskeletal: absent: Arthralgias


Skin: absent: Rash


Neurological: absent: Headache, Dizziness


Endocrine: absent: Diaphoresis


Hemo/Lymphatic: absent: Adenopathy


Psychiatric: absent: Anxiety, Depression





Physical Exam


Vital Signs Reviewed: Yes


Temperature: Afebrile


Blood Pressure: Normal


Pulse: Regular


Respiratory Rate: Normal


Appearance: Positive for: Well-Appearing, Non-Toxic, Uncomfortable


Pain Distress: Mild


Mental Status: Positive for: Alert and Oriented X 3





- Systems Exam


Head: Present: Atraumatic, Normocephalic


Pupils: Present: PERRL


Extroacular Muscles: Present: EOMI


Conjunctiva: Present: Normal


Ears: Present: Normal


Mouth: Present: Moist Mucous Membranes


Pharnyx: Present: Normal.  No: ERYTHEMA, EXUDATE


Neck: Present: Normal Range of Motion.  No: JVD


Respiratory/Chest: Present: Clear to Auscultation.  No: Wheezes, Rales, Rhonchi


Cardiovascular: Present: Regular Rate and Rhythm, Normal S1, S2.  No: Murmurs, 

Rub, Gallop


Abdomen: No: Tenderness, Rebound, Guarding


Upper Extremity: Present: Normal ROM, NORMAL PULSES, Neurovascularly Intact, 

Other (visible 2.5 cm laceration on right dorsal hand).  No: Cyanosis, Edema, 

Swelling, Erythema


Lower Extremity: Present: Normal Inspection


Skin: Present: Warm, Dry


Psychiatric: Present: Alert, Oriented x 3





Vital Signs











  Temp Pulse Resp BP Pulse Ox


 


 08/17/18 17:33  98.0 F  80  18   98


 


 08/17/18 17:31  98.2 F  80  18  133/78  99














Medical Decision Making


ED Course and Treatment: 





08/17/18 17:12


-Laceration was repaired using 6 nylon sutures


-Follow up in 14 days for suture removal


-Patient requested Tdap booster vaccine, will give in ED


 (Rd Snell)





Patient Seen With Resident:


In agreement with resident note. Patient was seen and evaluated with resident, 

came up with plan and treatment together.





 (Nicola Haywood)





- Medication Orders


Current Medication Orders: 














Discontinued Medications





Tetanus/Reduced Diphtheria/Acell Pertussis (Boostrix Vaccine Inj)  0.5 ml IM 

.ONCE ONE


   Stop: 08/17/18 17:18


   Last Admin: 08/17/18 17:31  Dose: 0.5 ml





Immunization Registry


 Document     08/17/18 17:31  [email or web address]  (Rec: 08/17/18 17:31  [id number]  [id number])


      Immunization Registry Consent Date         08/17/18














Procedure: Wound Repair





- Time Performed


Time Performed: 17:00





- Time Out


Time Out: Side verified, Site verified, Patient ID confirmed





- Consent Obtained


Consent obtained: Written





- Performed by


Performed by: Mid-level Provider (medical resident supervised by attending 

physician)





- Indications


Indication(s):: Laceration





- Location


Location:: Right, Dorsal, Hand


Shape:: Linear


Depth:: Epidermis





- Anesthetic Technique


Anesthetic Technique: Topical


Local/Regional Anesthetic:: Lidocaine 1% w/epi





- Debris


Debris:: None





- Irrigated


Irrigated with ml of normal saline: 100





- Complexity


Complexity:: Simple (one layer)





- Wound repair method


Sutures:: # (6), Size (5), Type (nylon)





- Patient tolerated procedure


Patient Tolerated Procedure:: Well





Disposition/Present on Arrival





- Present on Arrival


Any Indicators Present on Arrival: No


History of DVT/PE: No


History of Uncontrolled Diabetes: No


Urinary Catheter: No


History of Decub. Ulcer: No


History Surgical Site Infection Following: None





- Disposition


Have Diagnosis and Disposition been Completed?: Yes


Disposition Time: 17:13


Patient Plan: Discharge





- Disposition


Diagnosis: 


 Minor skin laceration





Disposition: HOME/ ROUTINE


Condition: GOOD


Discharge Instructions (ExitCare):  Taking Care of Cuts and Scrapes


Additional Instructions: 


Follow up for suture removal in 14 days.


Referrals: 


Yohana Tomlinson MD [Primary Care Provider] - Follow up with primary


Forms:  [employer] (English)